# Patient Record
Sex: MALE | Race: WHITE | Employment: FULL TIME | URBAN - METROPOLITAN AREA
[De-identification: names, ages, dates, MRNs, and addresses within clinical notes are randomized per-mention and may not be internally consistent; named-entity substitution may affect disease eponyms.]

---

## 2023-06-20 ASSESSMENT — PROMIS GLOBAL HEALTH SCALE
RATE_QUALITY_OF_LIFE: EXCELLENT
CARRYOUT_PHYSICAL_ACTIVITIES: COMPLETELY
RATE_GENERAL_HEALTH: VERY GOOD
RATE_PHYSICAL_HEALTH: VERY GOOD
RATE_AVERAGE_PAIN: 0
RATE_SOCIAL_SATISFACTION: VERY GOOD
EMOTIONAL_PROBLEMS: RARELY
RATE_AVERAGE_FATIGUE: MILD
RATE_MENTAL_HEALTH: EXCELLENT
CARRYOUT_SOCIAL_ACTIVITIES: EXCELLENT

## 2023-06-22 ENCOUNTER — LAB (OUTPATIENT)
Dept: LAB | Facility: LAB | Age: 53
End: 2023-06-22
Payer: COMMERCIAL

## 2023-06-22 ENCOUNTER — OFFICE VISIT (OUTPATIENT)
Dept: PRIMARY CARE | Facility: CLINIC | Age: 53
End: 2023-06-22
Payer: COMMERCIAL

## 2023-06-22 VITALS
SYSTOLIC BLOOD PRESSURE: 127 MMHG | TEMPERATURE: 98.3 F | WEIGHT: 214 LBS | BODY MASS INDEX: 30.64 KG/M2 | OXYGEN SATURATION: 93 % | HEIGHT: 70 IN | DIASTOLIC BLOOD PRESSURE: 82 MMHG | HEART RATE: 72 BPM

## 2023-06-22 DIAGNOSIS — E78.5 DYSLIPIDEMIA: Primary | ICD-10-CM

## 2023-06-22 DIAGNOSIS — Z13.6 SCREENING FOR HEART DISEASE: ICD-10-CM

## 2023-06-22 DIAGNOSIS — J30.1 NON-SEASONAL ALLERGIC RHINITIS DUE TO POLLEN: ICD-10-CM

## 2023-06-22 DIAGNOSIS — Z00.00 ROUTINE GENERAL MEDICAL EXAMINATION AT A HEALTH CARE FACILITY: ICD-10-CM

## 2023-06-22 DIAGNOSIS — R05.3 CHRONIC COUGH: ICD-10-CM

## 2023-06-22 DIAGNOSIS — Z12.11 COLON CANCER SCREENING: ICD-10-CM

## 2023-06-22 DIAGNOSIS — E78.5 DYSLIPIDEMIA: ICD-10-CM

## 2023-06-22 LAB
ALANINE AMINOTRANSFERASE (SGPT) (U/L) IN SER/PLAS: 19 U/L (ref 10–52)
ALBUMIN (G/DL) IN SER/PLAS: 4.5 G/DL (ref 3.4–5)
ALKALINE PHOSPHATASE (U/L) IN SER/PLAS: 56 U/L (ref 33–120)
ANION GAP IN SER/PLAS: 13 MMOL/L (ref 10–20)
APPEARANCE, URINE: CLEAR
ASPARTATE AMINOTRANSFERASE (SGOT) (U/L) IN SER/PLAS: 15 U/L (ref 9–39)
BASOPHILS (10*3/UL) IN BLOOD BY AUTOMATED COUNT: 0.04 X10E9/L (ref 0–0.1)
BASOPHILS/100 LEUKOCYTES IN BLOOD BY AUTOMATED COUNT: 0.5 % (ref 0–2)
BILIRUBIN TOTAL (MG/DL) IN SER/PLAS: 0.9 MG/DL (ref 0–1.2)
BILIRUBIN, URINE: NEGATIVE
BLOOD, URINE: NEGATIVE
CALCIUM (MG/DL) IN SER/PLAS: 9.8 MG/DL (ref 8.6–10.6)
CARBON DIOXIDE, TOTAL (MMOL/L) IN SER/PLAS: 29 MMOL/L (ref 21–32)
CHLORIDE (MMOL/L) IN SER/PLAS: 104 MMOL/L (ref 98–107)
CHOLESTEROL (MG/DL) IN SER/PLAS: 184 MG/DL (ref 0–199)
CHOLESTEROL IN HDL (MG/DL) IN SER/PLAS: 44.1 MG/DL
CHOLESTEROL/HDL RATIO: 4.2
COLOR, URINE: NORMAL
CREATININE (MG/DL) IN SER/PLAS: 1.04 MG/DL (ref 0.5–1.3)
EOSINOPHILS (10*3/UL) IN BLOOD BY AUTOMATED COUNT: 0.18 X10E9/L (ref 0–0.7)
EOSINOPHILS/100 LEUKOCYTES IN BLOOD BY AUTOMATED COUNT: 2.1 % (ref 0–6)
ERYTHROCYTE DISTRIBUTION WIDTH (RATIO) BY AUTOMATED COUNT: 13 % (ref 11.5–14.5)
ERYTHROCYTE MEAN CORPUSCULAR HEMOGLOBIN CONCENTRATION (G/DL) BY AUTOMATED: 32.7 G/DL (ref 32–36)
ERYTHROCYTE MEAN CORPUSCULAR VOLUME (FL) BY AUTOMATED COUNT: 89 FL (ref 80–100)
ERYTHROCYTES (10*6/UL) IN BLOOD BY AUTOMATED COUNT: 5.04 X10E12/L (ref 4.5–5.9)
ESTIMATED AVERAGE GLUCOSE FOR HBA1C: 103 MG/DL
GFR MALE: 85 ML/MIN/1.73M2
GLUCOSE (MG/DL) IN SER/PLAS: 109 MG/DL (ref 74–99)
GLUCOSE, URINE: NEGATIVE MG/DL
HEMATOCRIT (%) IN BLOOD BY AUTOMATED COUNT: 45 % (ref 41–52)
HEMOGLOBIN (G/DL) IN BLOOD: 14.7 G/DL (ref 13.5–17.5)
HEMOGLOBIN A1C/HEMOGLOBIN TOTAL IN BLOOD: 5.2 %
IMMATURE GRANULOCYTES/100 LEUKOCYTES IN BLOOD BY AUTOMATED COUNT: 1.1 % (ref 0–0.9)
KETONES, URINE: NEGATIVE MG/DL
LDL: 108 MG/DL (ref 0–99)
LEUKOCYTE ESTERASE, URINE: NEGATIVE
LEUKOCYTES (10*3/UL) IN BLOOD BY AUTOMATED COUNT: 8.4 X10E9/L (ref 4.4–11.3)
LYMPHOCYTES (10*3/UL) IN BLOOD BY AUTOMATED COUNT: 1.72 X10E9/L (ref 1.2–4.8)
LYMPHOCYTES/100 LEUKOCYTES IN BLOOD BY AUTOMATED COUNT: 20.5 % (ref 13–44)
MONOCYTES (10*3/UL) IN BLOOD BY AUTOMATED COUNT: 0.61 X10E9/L (ref 0.1–1)
MONOCYTES/100 LEUKOCYTES IN BLOOD BY AUTOMATED COUNT: 7.3 % (ref 2–10)
NEUTROPHILS (10*3/UL) IN BLOOD BY AUTOMATED COUNT: 5.77 X10E9/L (ref 1.2–7.7)
NEUTROPHILS/100 LEUKOCYTES IN BLOOD BY AUTOMATED COUNT: 68.5 % (ref 40–80)
NITRITE, URINE: NEGATIVE
NRBC (PER 100 WBCS) BY AUTOMATED COUNT: 0 /100 WBC (ref 0–0)
PH, URINE: 6 (ref 5–8)
PLATELETS (10*3/UL) IN BLOOD AUTOMATED COUNT: 289 X10E9/L (ref 150–450)
POTASSIUM (MMOL/L) IN SER/PLAS: 4.5 MMOL/L (ref 3.5–5.3)
PROSTATE SPECIFIC ANTIGEN,SCREEN: 1.44 NG/ML (ref 0–4)
PROTEIN TOTAL: 6.8 G/DL (ref 6.4–8.2)
PROTEIN, URINE: NEGATIVE MG/DL
SODIUM (MMOL/L) IN SER/PLAS: 141 MMOL/L (ref 136–145)
SPECIFIC GRAVITY, URINE: 1.01 (ref 1–1.03)
TRIGLYCERIDE (MG/DL) IN SER/PLAS: 161 MG/DL (ref 0–149)
UREA NITROGEN (MG/DL) IN SER/PLAS: 17 MG/DL (ref 6–23)
UROBILINOGEN, URINE: <2 MG/DL (ref 0–1.9)
VLDL: 32 MG/DL (ref 0–40)

## 2023-06-22 PROCEDURE — 85025 COMPLETE CBC W/AUTO DIFF WBC: CPT

## 2023-06-22 PROCEDURE — 84153 ASSAY OF PSA TOTAL: CPT

## 2023-06-22 PROCEDURE — 81003 URINALYSIS AUTO W/O SCOPE: CPT

## 2023-06-22 PROCEDURE — 80053 COMPREHEN METABOLIC PANEL: CPT

## 2023-06-22 PROCEDURE — 80061 LIPID PANEL: CPT

## 2023-06-22 PROCEDURE — 99214 OFFICE O/P EST MOD 30 MIN: CPT | Performed by: INTERNAL MEDICINE

## 2023-06-22 PROCEDURE — 83036 HEMOGLOBIN GLYCOSYLATED A1C: CPT

## 2023-06-22 PROCEDURE — 36415 COLL VENOUS BLD VENIPUNCTURE: CPT

## 2023-06-22 RX ORDER — ATORVASTATIN CALCIUM 40 MG/1
TABLET, FILM COATED ORAL
COMMUNITY
Start: 2013-10-16 | End: 2023-07-07 | Stop reason: SDUPTHER

## 2023-06-22 RX ORDER — IPRATROPIUM BROMIDE 21 UG/1
2 SPRAY, METERED NASAL EVERY 12 HOURS
Qty: 30 ML | Refills: 12 | Status: SHIPPED | OUTPATIENT
Start: 2023-06-22 | End: 2024-01-29 | Stop reason: WASHOUT

## 2023-06-22 ASSESSMENT — LIFESTYLE VARIABLES
HOW MANY STANDARD DRINKS CONTAINING ALCOHOL DO YOU HAVE ON A TYPICAL DAY: 1 OR 2
SKIP TO QUESTIONS 9-10: 0
HOW OFTEN DO YOU HAVE SIX OR MORE DRINKS ON ONE OCCASION: LESS THAN MONTHLY
HOW OFTEN DO YOU HAVE A DRINK CONTAINING ALCOHOL: MONTHLY OR LESS
AUDIT-C TOTAL SCORE: 2

## 2023-06-22 ASSESSMENT — PATIENT HEALTH QUESTIONNAIRE - PHQ9
SUM OF ALL RESPONSES TO PHQ9 QUESTIONS 1 & 2: 0
2. FEELING DOWN, DEPRESSED OR HOPELESS: NOT AT ALL
1. LITTLE INTEREST OR PLEASURE IN DOING THINGS: NOT AT ALL

## 2023-06-22 ASSESSMENT — COLUMBIA-SUICIDE SEVERITY RATING SCALE - C-SSRS: 1. IN THE PAST MONTH, HAVE YOU WISHED YOU WERE DEAD OR WISHED YOU COULD GO TO SLEEP AND NOT WAKE UP?: NO

## 2023-06-22 NOTE — PROGRESS NOTES
"Subjective   Patient ID: Onofre Jiang Jr. is a 52 y.o. male who presents for Annual Exam.    HPI patient presents to clinic complaining of chronic dry cough, watery eyes and postnasal drip for the past 6 months.  He also had an episode of fullness in his chest which got relieved with aspirin several months ago.  He is also concerned about tingling of his left foot and nocturia over the past few months.  He denies any fever, chills, shortness of breath, wheezing, palpitation diaphoresis and loss of consciousness.  He has history of  skin cancer, seasonal allergies, hyperlipidemia hypertriglyceridemia and had a normal stress echo in 2010 with EF of 60% EKG done in the office showed sinus rhythm at 60 bpm with normal axis normal interval without any ischemic changes        Review of Systems   Constitutional: Negative.    HENT: Negative.     Eyes: Negative.    Respiratory:  Positive for cough.    Cardiovascular: Negative.    Gastrointestinal: Negative.    Endocrine: Negative.    Genitourinary: Negative.    Musculoskeletal: Negative.    Skin: Negative.    Allergic/Immunologic: Negative.    Neurological: Negative.    Hematological: Negative.    Psychiatric/Behavioral: Negative.         Objective   /82   Pulse 72   Temp 36.8 °C (98.3 °F)   Ht 1.778 m (5' 10\")   Wt 97.1 kg (214 lb)   SpO2 93%   BMI 30.71 kg/m²     Physical Exam  Constitutional:       Appearance: Normal appearance. He is obese.   HENT:      Right Ear: Tympanic membrane normal.      Left Ear: Tympanic membrane and ear canal normal.      Nose: Nose normal.   Neck:      Vascular: No carotid bruit.   Cardiovascular:      Rate and Rhythm: Normal rate.   Pulmonary:      Effort: No respiratory distress.      Breath sounds: No stridor. No wheezing.   Abdominal:      Palpations: Abdomen is soft.      Tenderness: There is no guarding or rebound.   Skin:     Coloration: Skin is not jaundiced.      Findings: No bruising.   Neurological:      General: No " focal deficit present.      Mental Status: He is alert and oriented to person, place, and time.   Psychiatric:         Mood and Affect: Mood normal.         Assessment/Plan    patient will be scheduled for CT cardiac scoring regarding screening for cardiovascular disease and  colonoscopy for colon cancer screening.  He will be scheduled for routine blood work and is given trial with Atrovent nasal spray for allergy symptoms.  He will return to clinic in 2 weeks for follow-up visit.

## 2023-06-22 NOTE — PROGRESS NOTES
"Subjective   Patient ID: Onofre Jiang Jr. is a 52 y.o. male who presents for Annual Exam.    HPI     Review of Systems    Objective   /82   Pulse 72   Temp 36.8 °C (98.3 °F)   Ht 1.778 m (5' 10\")   Wt 97.1 kg (214 lb)   SpO2 93%   BMI 30.71 kg/m²     Physical Exam    Assessment/Plan          "

## 2023-06-24 ASSESSMENT — ENCOUNTER SYMPTOMS
GASTROINTESTINAL NEGATIVE: 1
EYES NEGATIVE: 1
COUGH: 1
ALLERGIC/IMMUNOLOGIC NEGATIVE: 1
CONSTITUTIONAL NEGATIVE: 1
NEUROLOGICAL NEGATIVE: 1
CARDIOVASCULAR NEGATIVE: 1
MUSCULOSKELETAL NEGATIVE: 1
ENDOCRINE NEGATIVE: 1
HEMATOLOGIC/LYMPHATIC NEGATIVE: 1
PSYCHIATRIC NEGATIVE: 1

## 2023-07-06 ENCOUNTER — APPOINTMENT (OUTPATIENT)
Dept: PRIMARY CARE | Facility: CLINIC | Age: 53
End: 2023-07-06
Payer: COMMERCIAL

## 2023-07-07 DIAGNOSIS — E78.5 DYSLIPIDEMIA: Primary | ICD-10-CM

## 2023-07-07 RX ORDER — ATORVASTATIN CALCIUM 40 MG/1
40 TABLET, FILM COATED ORAL DAILY
Qty: 90 TABLET | Refills: 2 | Status: SHIPPED | OUTPATIENT
Start: 2023-07-07 | End: 2023-07-27 | Stop reason: SDUPTHER

## 2023-07-27 ENCOUNTER — OFFICE VISIT (OUTPATIENT)
Dept: PRIMARY CARE | Facility: CLINIC | Age: 53
End: 2023-07-27
Payer: COMMERCIAL

## 2023-07-27 VITALS
SYSTOLIC BLOOD PRESSURE: 139 MMHG | OXYGEN SATURATION: 95 % | TEMPERATURE: 98 F | HEART RATE: 76 BPM | HEIGHT: 70 IN | BODY MASS INDEX: 31.78 KG/M2 | WEIGHT: 222 LBS | DIASTOLIC BLOOD PRESSURE: 89 MMHG

## 2023-07-27 DIAGNOSIS — I77.810 DILATATION OF THORACIC AORTA (CMS-HCC): Primary | ICD-10-CM

## 2023-07-27 DIAGNOSIS — E78.5 DYSLIPIDEMIA: ICD-10-CM

## 2023-07-27 DIAGNOSIS — N40.1 BENIGN PROSTATIC HYPERPLASIA WITH WEAK URINARY STREAM: ICD-10-CM

## 2023-07-27 DIAGNOSIS — R73.9 HYPERGLYCEMIA: ICD-10-CM

## 2023-07-27 DIAGNOSIS — F10.20 UNCOMPLICATED ALCOHOL DEPENDENCE (MULTI): ICD-10-CM

## 2023-07-27 DIAGNOSIS — R39.12 BENIGN PROSTATIC HYPERPLASIA WITH WEAK URINARY STREAM: ICD-10-CM

## 2023-07-27 DIAGNOSIS — I25.10 ATHEROSCLEROSIS OF NATIVE CORONARY ARTERY OF NATIVE HEART WITHOUT ANGINA PECTORIS: ICD-10-CM

## 2023-07-27 PROBLEM — J30.1 SEASONAL ALLERGIC RHINITIS DUE TO POLLEN: Status: ACTIVE | Noted: 2023-07-27

## 2023-07-27 PROCEDURE — 99214 OFFICE O/P EST MOD 30 MIN: CPT | Performed by: INTERNAL MEDICINE

## 2023-07-27 RX ORDER — ATORVASTATIN CALCIUM 40 MG/1
40 TABLET, FILM COATED ORAL DAILY
Qty: 90 TABLET | Refills: 2 | Status: SHIPPED | OUTPATIENT
Start: 2023-07-27 | End: 2024-01-29 | Stop reason: WASHOUT

## 2023-07-27 ASSESSMENT — LIFESTYLE VARIABLES
HOW OFTEN DO YOU HAVE A DRINK CONTAINING ALCOHOL: MONTHLY OR LESS
HOW OFTEN DO YOU HAVE SIX OR MORE DRINKS ON ONE OCCASION: LESS THAN MONTHLY
HOW MANY STANDARD DRINKS CONTAINING ALCOHOL DO YOU HAVE ON A TYPICAL DAY: 1 OR 2
AUDIT-C TOTAL SCORE: 2
SKIP TO QUESTIONS 9-10: 0

## 2023-07-27 ASSESSMENT — PATIENT HEALTH QUESTIONNAIRE - PHQ9
2. FEELING DOWN, DEPRESSED OR HOPELESS: NOT AT ALL
1. LITTLE INTEREST OR PLEASURE IN DOING THINGS: NOT AT ALL
SUM OF ALL RESPONSES TO PHQ9 QUESTIONS 1 AND 2: 0

## 2023-07-27 ASSESSMENT — COLUMBIA-SUICIDE SEVERITY RATING SCALE - C-SSRS
1. IN THE PAST MONTH, HAVE YOU WISHED YOU WERE DEAD OR WISHED YOU COULD GO TO SLEEP AND NOT WAKE UP?: NO
2. HAVE YOU ACTUALLY HAD ANY THOUGHTS OF KILLING YOURSELF?: NO
6. HAVE YOU EVER DONE ANYTHING, STARTED TO DO ANYTHING, OR PREPARED TO DO ANYTHING TO END YOUR LIFE?: NO

## 2023-07-27 ASSESSMENT — PAIN SCALES - GENERAL: PAINLEVEL: 0-NO PAIN

## 2023-07-27 NOTE — PROGRESS NOTES
"Subjective   Patient ID: Onofre Jiang Jr. is a 52 y.o. male who presents for Follow-up.    HPI     Review of Systems    Objective   /89   Pulse 76   Temp 36.7 °C (98 °F)   Ht 1.778 m (5' 10\")   Wt 101 kg (222 lb)   SpO2 95%   BMI 31.85 kg/m²     Physical Exam    Assessment/Plan          "

## 2023-07-27 NOTE — PROGRESS NOTES
"Subjective   Patient ID: Onofre Jiang Jr. is a 52 y.o. male who presents for Follow-up.    HPI presents to clinic for follow-up visit on history of admits to drinking alcohol f  skin cancer, seasonal allergies, hyperlipidemia hypertriglyceridemia and had a normal stress echo in 2010 with EF of 60% he is complaining of nocturia weak urinary stream's over the past few months.  He denies any abdominal pain, nausea vomiting flank pain dysuria , cough congestion shortness of breath and wheezing.  Laboratory studies done shows hemoglobin A1c of 5.2% , serum glucose of 109 potassium of 4.5, serum cholesterol of 184 LDL of 108 and other studies are unremarkable.  CT cardiac scoring came back coronary artery calcium score of 128.27 elevated thoracic aorta measuring 4 cm without any evidence of mediastinal lymphadenopathy or lung nodule.  He also admits to drinking alcohol on a daily basis.       Review of Systems   Constitutional: Negative.    HENT: Negative.     Eyes: Negative.    Respiratory: Negative.     Cardiovascular: Negative.    Gastrointestinal: Negative.    Endocrine: Negative.    Genitourinary:  Positive for decreased urine volume.   Musculoskeletal: Negative.    Skin: Negative.    Allergic/Immunologic: Negative.    Neurological: Negative.    Hematological: Negative.    Psychiatric/Behavioral: Negative.         Objective   /89   Pulse 76   Temp 36.7 °C (98 °F)   Ht 1.778 m (5' 10\")   Wt 101 kg (222 lb)   SpO2 95%   BMI 31.85 kg/m²     Physical Exam  Constitutional:       Appearance: Normal appearance. He is normal weight.   HENT:      Right Ear: Tympanic membrane normal.      Left Ear: Tympanic membrane and ear canal normal.      Nose: Nose normal.   Neck:      Vascular: No carotid bruit.   Cardiovascular:      Rate and Rhythm: Normal rate.   Pulmonary:      Effort: No respiratory distress.      Breath sounds: No stridor. No wheezing.   Abdominal:      Palpations: Abdomen is soft.      Tenderness: " There is abdominal tenderness. There is no guarding or rebound.   Skin:     Coloration: Skin is not jaundiced.   Neurological:      General: No focal deficit present.      Mental Status: He is alert and oriented to person, place, and time.   Psychiatric:         Mood and Affect: Mood normal.         Assessment/Plan    patient will be referred to cardiology clinic regarding coronary atherosclerosis and dilated thoracic aorta.  He is encouraged to cut down on drinking alcohol.  He is interested in taking saw palmetto regarding his symptoms of BPH.  He will be referred to urology clinic regarding his symptoms of BPH.  He will continue current medications and will return to clinic in 6 months for follow-up visit.

## 2023-07-28 ASSESSMENT — ENCOUNTER SYMPTOMS
CARDIOVASCULAR NEGATIVE: 1
NEUROLOGICAL NEGATIVE: 1
HEMATOLOGIC/LYMPHATIC NEGATIVE: 1
RESPIRATORY NEGATIVE: 1
MUSCULOSKELETAL NEGATIVE: 1
CONSTITUTIONAL NEGATIVE: 1
EYES NEGATIVE: 1
GASTROINTESTINAL NEGATIVE: 1
ALLERGIC/IMMUNOLOGIC NEGATIVE: 1
ENDOCRINE NEGATIVE: 1
PSYCHIATRIC NEGATIVE: 1

## 2023-10-06 DIAGNOSIS — Z12.11 COLON CANCER SCREENING: ICD-10-CM

## 2023-10-06 RX ORDER — POLYETHYLENE GLYCOL 3350, SODIUM SULFATE ANHYDROUS, SODIUM BICARBONATE, SODIUM CHLORIDE, POTASSIUM CHLORIDE 236; 22.74; 6.74; 5.86; 2.97 G/4L; G/4L; G/4L; G/4L; G/4L
4000 POWDER, FOR SOLUTION ORAL ONCE
Qty: 4000 ML | Refills: 0 | Status: SHIPPED | OUTPATIENT
Start: 2023-10-06 | End: 2023-10-06

## 2024-01-15 DIAGNOSIS — E78.5 HYPERLIPIDEMIA, UNSPECIFIED HYPERLIPIDEMIA TYPE: ICD-10-CM

## 2024-01-18 ENCOUNTER — LAB (OUTPATIENT)
Dept: LAB | Facility: LAB | Age: 54
End: 2024-01-18
Payer: COMMERCIAL

## 2024-01-18 DIAGNOSIS — I25.10 ATHEROSCLEROSIS OF NATIVE CORONARY ARTERY OF NATIVE HEART WITHOUT ANGINA PECTORIS: ICD-10-CM

## 2024-01-18 DIAGNOSIS — R73.9 HYPERGLYCEMIA: ICD-10-CM

## 2024-01-18 DIAGNOSIS — E78.5 HYPERLIPIDEMIA, UNSPECIFIED HYPERLIPIDEMIA TYPE: ICD-10-CM

## 2024-01-18 LAB
ANION GAP SERPL CALC-SCNC: 14 MMOL/L (ref 10–20)
BUN SERPL-MCNC: 18 MG/DL (ref 6–23)
CALCIUM SERPL-MCNC: 9.9 MG/DL (ref 8.6–10.6)
CHLORIDE SERPL-SCNC: 100 MMOL/L (ref 98–107)
CHOLEST SERPL-MCNC: 195 MG/DL (ref 0–199)
CHOLESTEROL/HDL RATIO: 3.9
CO2 SERPL-SCNC: 29 MMOL/L (ref 21–32)
CREAT SERPL-MCNC: 1.1 MG/DL (ref 0.5–1.3)
EGFRCR SERPLBLD CKD-EPI 2021: 80 ML/MIN/1.73M*2
EST. AVERAGE GLUCOSE BLD GHB EST-MCNC: 108 MG/DL
GLUCOSE SERPL-MCNC: 100 MG/DL (ref 74–99)
HBA1C MFR BLD: 5.4 %
HDLC SERPL-MCNC: 49.4 MG/DL
LDLC SERPL CALC-MCNC: 86 MG/DL
NON HDL CHOLESTEROL: 146 MG/DL (ref 0–149)
POTASSIUM SERPL-SCNC: 4.4 MMOL/L (ref 3.5–5.3)
SODIUM SERPL-SCNC: 139 MMOL/L (ref 136–145)
TRIGL SERPL-MCNC: 299 MG/DL (ref 0–149)
VLDL: 60 MG/DL (ref 0–40)

## 2024-01-18 PROCEDURE — 80048 BASIC METABOLIC PNL TOTAL CA: CPT

## 2024-01-18 PROCEDURE — 36415 COLL VENOUS BLD VENIPUNCTURE: CPT

## 2024-01-18 PROCEDURE — 83036 HEMOGLOBIN GLYCOSYLATED A1C: CPT

## 2024-01-18 PROCEDURE — 80061 LIPID PANEL: CPT

## 2024-01-29 ENCOUNTER — OFFICE VISIT (OUTPATIENT)
Dept: PRIMARY CARE | Facility: CLINIC | Age: 54
End: 2024-01-29
Payer: COMMERCIAL

## 2024-01-29 VITALS
BODY MASS INDEX: 32.5 KG/M2 | HEART RATE: 75 BPM | WEIGHT: 227 LBS | OXYGEN SATURATION: 94 % | DIASTOLIC BLOOD PRESSURE: 91 MMHG | HEIGHT: 70 IN | TEMPERATURE: 98.6 F | SYSTOLIC BLOOD PRESSURE: 142 MMHG

## 2024-01-29 DIAGNOSIS — E78.49 OTHER HYPERLIPIDEMIA: ICD-10-CM

## 2024-01-29 DIAGNOSIS — Z78.9 ALCOHOL USE: ICD-10-CM

## 2024-01-29 DIAGNOSIS — J30.1 SEASONAL ALLERGIC RHINITIS DUE TO POLLEN: ICD-10-CM

## 2024-01-29 DIAGNOSIS — I25.10 ATHEROSCLEROSIS OF NATIVE CORONARY ARTERY OF NATIVE HEART WITHOUT ANGINA PECTORIS: ICD-10-CM

## 2024-01-29 DIAGNOSIS — R73.9 HYPERGLYCEMIA: Primary | ICD-10-CM

## 2024-01-29 PROCEDURE — 99214 OFFICE O/P EST MOD 30 MIN: CPT | Performed by: INTERNAL MEDICINE

## 2024-01-29 PROCEDURE — 1036F TOBACCO NON-USER: CPT | Performed by: INTERNAL MEDICINE

## 2024-01-29 RX ORDER — FLUTICASONE PROPIONATE 50 MCG
SPRAY, SUSPENSION (ML) NASAL
COMMUNITY
Start: 2021-04-22 | End: 2024-02-02 | Stop reason: ALTCHOICE

## 2024-01-29 RX ORDER — ASPIRIN 81 MG/1
1 TABLET ORAL DAILY
COMMUNITY
Start: 2023-07-28

## 2024-01-29 RX ORDER — ROSUVASTATIN CALCIUM 40 MG/1
1 TABLET, COATED ORAL NIGHTLY
COMMUNITY
Start: 2023-07-28

## 2024-01-29 ASSESSMENT — PATIENT HEALTH QUESTIONNAIRE - PHQ9
1. LITTLE INTEREST OR PLEASURE IN DOING THINGS: NOT AT ALL
SUM OF ALL RESPONSES TO PHQ9 QUESTIONS 1 AND 2: 0
2. FEELING DOWN, DEPRESSED OR HOPELESS: NOT AT ALL

## 2024-01-29 ASSESSMENT — LIFESTYLE VARIABLES
HOW OFTEN DO YOU HAVE A DRINK CONTAINING ALCOHOL: MONTHLY OR LESS
HOW OFTEN DO YOU HAVE SIX OR MORE DRINKS ON ONE OCCASION: LESS THAN MONTHLY
AUDIT-C TOTAL SCORE: 2
HOW MANY STANDARD DRINKS CONTAINING ALCOHOL DO YOU HAVE ON A TYPICAL DAY: 1 OR 2
SKIP TO QUESTIONS 9-10: 0

## 2024-01-29 ASSESSMENT — COLUMBIA-SUICIDE SEVERITY RATING SCALE - C-SSRS: 1. IN THE PAST MONTH, HAVE YOU WISHED YOU WERE DEAD OR WISHED YOU COULD GO TO SLEEP AND NOT WAKE UP?: NO

## 2024-01-29 ASSESSMENT — PAIN SCALES - GENERAL: PAINLEVEL: 0-NO PAIN

## 2024-01-29 NOTE — PROGRESS NOTES
"Subjective   Patient ID: Onofre Jiang Jr. is a 53 y.o. male who presents for Follow-up (6 month ).    HPI     Review of Systems    Objective   BP (!) 142/91   Pulse 75   Temp 37 °C (98.6 °F)   Ht 1.778 m (5' 10\")   Wt 103 kg (227 lb)   SpO2 94%   BMI 32.57 kg/m²     Physical Exam    Assessment/Plan          "

## 2024-01-29 NOTE — PROGRESS NOTES
"Subjective   Patient ID: Onofre Jiang Jr. is a 53 y.o. male who presents for Follow-up (6 month ).    HPI patient presents to clinic for follow-up visit on history of alcohol use, dilated ascending thoracic aorta, coronary atherosclerosis, seasonal allergies, skin cancer, hyperlipidemia and hypertriglyceridemia.  He is doing fairly well and denies any chest pain, fever chills cough congestion and swelling of legs.  Laboratory studies done shows serum glucose of 100 creatinine of 1.10, hemoglobin A1c of 5.4%, cholesterol of 195 triglycerides of 299 HDL of 49.4 and other studies are unremarkable.  CT cardiac scoring done revealed coronary artery calcium score of 128.27 and aneurysmal dilatation of the ascending thoracic aorta.  2D echocardiogram done showed ejection fraction of 60 to 65% without any valvular heart disease or pericardial effusion.       Review of Systems   Constitutional: Negative.    HENT: Negative.     Eyes: Negative.    Respiratory: Negative.     Cardiovascular: Negative.    Gastrointestinal: Negative.    Endocrine: Negative.    Genitourinary: Negative.    Musculoskeletal: Negative.    Skin: Negative.    Allergic/Immunologic: Negative.    Neurological: Negative.    Hematological: Negative.    Psychiatric/Behavioral: Negative.         Objective   BP (!) 142/91   Pulse 75   Temp 37 °C (98.6 °F)   Ht 1.778 m (5' 10\")   Wt 103 kg (227 lb)   SpO2 94%   BMI 32.57 kg/m²     Physical Exam  Constitutional:       Appearance: Normal appearance. He is obese.   HENT:      Right Ear: Tympanic membrane normal.      Left Ear: Tympanic membrane and ear canal normal.      Nose: Nose normal.   Neck:      Vascular: No carotid bruit.   Cardiovascular:      Rate and Rhythm: Normal rate.   Pulmonary:      Effort: No respiratory distress.      Breath sounds: No stridor. No wheezing.   Abdominal:      Palpations: Abdomen is soft.      Tenderness: There is no guarding or rebound.   Skin:     Coloration: Skin is not " jaundiced.   Neurological:      General: No focal deficit present.      Mental Status: He is alert and oriented to person, place, and time.   Psychiatric:         Mood and Affect: Mood normal.         Assessment/Plan    patient is encouraged to cut down on drinking alcohol.  He will continue current medication.  He is advised to consider cardiology consultation regarding coronary atherosclerosis.  He will continue current medications and will return to clinic in 6 months for follow-up visit.

## 2024-02-02 ENCOUNTER — OFFICE VISIT (OUTPATIENT)
Dept: CARDIOLOGY | Facility: CLINIC | Age: 54
End: 2024-02-02
Payer: COMMERCIAL

## 2024-02-02 VITALS
HEIGHT: 70 IN | HEART RATE: 70 BPM | WEIGHT: 220 LBS | BODY MASS INDEX: 31.5 KG/M2 | DIASTOLIC BLOOD PRESSURE: 90 MMHG | SYSTOLIC BLOOD PRESSURE: 134 MMHG | OXYGEN SATURATION: 94 %

## 2024-02-02 DIAGNOSIS — E78.49 OTHER HYPERLIPIDEMIA: Primary | ICD-10-CM

## 2024-02-02 PROCEDURE — 99214 OFFICE O/P EST MOD 30 MIN: CPT | Performed by: INTERNAL MEDICINE

## 2024-02-02 PROCEDURE — 1036F TOBACCO NON-USER: CPT | Performed by: INTERNAL MEDICINE

## 2024-02-02 ASSESSMENT — ENCOUNTER SYMPTOMS
CONSTITUTIONAL NEGATIVE: 1
RESPIRATORY NEGATIVE: 1
PSYCHIATRIC NEGATIVE: 1
MUSCULOSKELETAL NEGATIVE: 1
GASTROINTESTINAL NEGATIVE: 1
NEUROLOGICAL NEGATIVE: 1
ALLERGIC/IMMUNOLOGIC NEGATIVE: 1
LOSS OF SENSATION IN FEET: 0
CARDIOVASCULAR NEGATIVE: 1
DEPRESSION: 0
HEMATOLOGIC/LYMPHATIC NEGATIVE: 1
EYES NEGATIVE: 1
OCCASIONAL FEELINGS OF UNSTEADINESS: 0
ENDOCRINE NEGATIVE: 1

## 2024-02-02 ASSESSMENT — PAIN SCALES - GENERAL: PAINLEVEL: 0-NO PAIN

## 2024-02-20 ENCOUNTER — HOSPITAL ENCOUNTER (OUTPATIENT)
Dept: OPERATING ROOM | Facility: CLINIC | Age: 54
Setting detail: OUTPATIENT SURGERY
Discharge: HOME | End: 2024-02-20
Payer: COMMERCIAL

## 2024-02-20 VITALS
WEIGHT: 220.24 LBS | TEMPERATURE: 98.2 F | SYSTOLIC BLOOD PRESSURE: 116 MMHG | RESPIRATION RATE: 16 BRPM | BODY MASS INDEX: 32.62 KG/M2 | DIASTOLIC BLOOD PRESSURE: 70 MMHG | HEIGHT: 69 IN | OXYGEN SATURATION: 95 % | HEART RATE: 65 BPM

## 2024-02-20 DIAGNOSIS — Z12.11 COLON CANCER SCREENING: Primary | ICD-10-CM

## 2024-02-20 PROCEDURE — 99153 MOD SED SAME PHYS/QHP EA: CPT

## 2024-02-20 PROCEDURE — 99152 MOD SED SAME PHYS/QHP 5/>YRS: CPT

## 2024-02-20 PROCEDURE — 3600000007 HC OR TIME - EACH INCREMENTAL 1 MINUTE - PROCEDURE LEVEL TWO

## 2024-02-20 PROCEDURE — 7100000010 HC PHASE TWO TIME - EACH INCREMENTAL 1 MINUTE

## 2024-02-20 PROCEDURE — 7100000009 HC PHASE TWO TIME - INITIAL BASE CHARGE

## 2024-02-20 PROCEDURE — 3600000002 HC OR TIME - INITIAL BASE CHARGE - PROCEDURE LEVEL TWO

## 2024-02-20 PROCEDURE — 2500000004 HC RX 250 GENERAL PHARMACY W/ HCPCS (ALT 636 FOR OP/ED): Performed by: INTERNAL MEDICINE

## 2024-02-20 PROCEDURE — 45378 DIAGNOSTIC COLONOSCOPY: CPT | Performed by: INTERNAL MEDICINE

## 2024-02-20 RX ORDER — MIDAZOLAM HYDROCHLORIDE 1 MG/ML
INJECTION, SOLUTION INTRAMUSCULAR; INTRAVENOUS AS NEEDED
Status: COMPLETED | OUTPATIENT
Start: 2024-02-20 | End: 2024-02-20

## 2024-02-20 RX ORDER — FENTANYL CITRATE 50 UG/ML
INJECTION, SOLUTION INTRAMUSCULAR; INTRAVENOUS AS NEEDED
Status: COMPLETED | OUTPATIENT
Start: 2024-02-20 | End: 2024-02-20

## 2024-02-20 RX ORDER — SODIUM CHLORIDE, SODIUM LACTATE, POTASSIUM CHLORIDE, CALCIUM CHLORIDE 600; 310; 30; 20 MG/100ML; MG/100ML; MG/100ML; MG/100ML
20 INJECTION, SOLUTION INTRAVENOUS CONTINUOUS
Status: CANCELLED | OUTPATIENT
Start: 2024-02-20

## 2024-02-20 RX ADMIN — FENTANYL CITRATE 25 MCG: 50 INJECTION, SOLUTION INTRAMUSCULAR; INTRAVENOUS at 09:05

## 2024-02-20 RX ADMIN — MIDAZOLAM 1 MG: 1 INJECTION INTRAMUSCULAR; INTRAVENOUS at 09:01

## 2024-02-20 RX ADMIN — FENTANYL CITRATE 25 MCG: 50 INJECTION, SOLUTION INTRAMUSCULAR; INTRAVENOUS at 09:01

## 2024-02-20 RX ADMIN — FENTANYL CITRATE 50 MCG: 50 INJECTION, SOLUTION INTRAMUSCULAR; INTRAVENOUS at 08:59

## 2024-02-20 RX ADMIN — MIDAZOLAM 2 MG: 1 INJECTION INTRAMUSCULAR; INTRAVENOUS at 08:59

## 2024-02-20 RX ADMIN — MIDAZOLAM 1 MG: 1 INJECTION INTRAMUSCULAR; INTRAVENOUS at 09:05

## 2024-02-20 ASSESSMENT — PAIN SCALES - GENERAL
PAINLEVEL_OUTOF10: 0 - NO PAIN
PAINLEVEL_OUTOF10: 3
PAINLEVEL_OUTOF10: 0 - NO PAIN
PAINLEVEL_OUTOF10: 0 - NO PAIN

## 2024-02-20 ASSESSMENT — PAIN - FUNCTIONAL ASSESSMENT
PAIN_FUNCTIONAL_ASSESSMENT: 0-10

## 2024-02-20 NOTE — DISCHARGE INSTRUCTIONS
Patient Instructions after a Colonoscopy      The anesthetics, sedatives or narcotics which were given to you today will be acting in your body for the next 24 hours, so you might feel a little sleepy or groggy.  This feeling should slowly wear off. Carefully read and follow the instructions.     You received sedation today:  - Do not drive or operate any machinery or power tools of any kind.   - No alcoholic beverages today, not even beer or wine.  - Do not make any important decisions or sign any legal documents.  - No over the counter medications that contain alcohol or that may cause drowsiness.  - Do not make any important decisions or sign any legal documents.    While it is common to experience mild to moderate abdominal distention, gas, or belching after your procedure, if any of these symptoms occur following discharge from the GI Lab or within one week of having your procedure, call the [facility] to be advised whether a visit to your nearest Urgent Care or Emergency Department is indicated.  Take this paper with you if you go.     - If you develop an allergic reaction to the medications that were given during your procedure such as difficulty breathing, rash, hives, severe nausea, vomiting or lightheadedness.  - If you experience chest pain, shortness of breath, severe abdominal pain, fevers and chills.  -If you develop signs and symptoms of bleeding such as blood in your spit, if your stools turn black, tarry, or bloody  - If you have not urinated within 8 hours following your procedure.  - If your IV site becomes painful, red, inflamed, or looks infected.    If you received a biopsy/polypectomy/sphincterotomy the following instructions apply below:    __ Do not use Aspirin containing products, non-steroidal medications or anti-coagulants for one week following your procedure. (Examples of these types of medications are: Advil, Arthrotec, Aleve, Coumadin, Ecotrin, Heparin, Ibuprofen,  Indocin, Motrin, Naprosyn, Nuprin, Plavix, Vioxx, and Voltarin, or their generic forms.  This list is not all-inclusive.  Check with your physician or pharmacist before resuming medications.)   __ Eat a soft diet today.  Avoid foods that are poorly digested for the next 24 hours.  These foods would include: nuts, beans, lettuce, red meats, and fried foods. Start with liquids and advance your diet as tolerated, gradually work up to eating solids.   __ Do not have a Barium Study or Enema for one week.    Your physician recommends the additional following instructions:    -You have a contact number available for emergencies. The signs and symptoms of potential delayed complications were discussed with you. You may return to normal activities tomorrow.  -Resume your previous diet.  -Continue your present medications.   -We are waiting for your pathology results.  -Your physician has recommended a repeat colonoscopy (date to be determined after pending pathology results are reviewed) for surveillance based on pathology results.  -The findings and recommendations have been discussed with you.  -The findings and recommendations were discussed with your family.  - Please see Medication Reconciliation Form for new medication/medications prescribed.       If you experience any problems or have any questions following discharge from the GI Lab, please call: [phone]

## 2024-02-20 NOTE — H&P
"History Of Present Illness  Attila Jiang Jr. \"Onofre\" is a 53 y.o. male presenting for colonoscopy for screening for colon polyps and colon cancer.     Past Medical History  Past Medical History:   Diagnosis Date    Allergic     Body mass index (BMI) 32.0-32.9, adult 11/24/2020    BMI 32.0-32.9,adult    Encounter for immunization 04/17/2020    Need for DTP vaccine    Hyperlipidemia     Tinnitus, left ear 07/07/2016    Tinnitus of left ear    Unspecified disorder of left ear 07/07/2016    Ear problems, left     Surgical History  Past Surgical History:   Procedure Laterality Date    CIRCUMCISION, PRIMARY      HERNIA REPAIR  07/01/2013    Inguinal Hernia Repair    WISDOM TOOTH EXTRACTION       Social History  He reports that he quit smoking about 25 years ago. His smoking use included cigarettes. He started smoking about 35 years ago. He has a 6.25 pack-year smoking history. He has never used smokeless tobacco. He reports current alcohol use. He reports that he does not use drugs.    Family History  Family History   Problem Relation Name Age of Onset    Early natural death Paternal Grandfather Onofre     Heart disease Paternal Grandfather Onofre     Cancer Paternal Grandmother Nichole     Learning disabilities Son Ruben     Atrial fibrillation Mother Prisca Gonzales     Cancer Father Onofre Jiang         Allergies  Allergies   Allergen Reactions    Opioids-Meperidine And Related Anaphylaxis    Opioids-Methadone And Related Anaphylaxis    Hydrocodone-Acetaminophen Diarrhea    Opioids - Morphine Analogues Angioedema     Review of Systems  Pre-sedation Evaluation:  ASA Classification - ASA 2 - Patient with mild systemic disease with no functional limitations  Mallampati Score - II (hard and soft palate, upper portion of tonsils anduvula visible)    Physical Exam   CONSTITUTIONAL: no acute distress, appears stated age  PULMONARY: clear to auscultation bilaterally  CARDIOVASCULAR: regular rate and rhythm  ABDOMEN: soft, " "non-tender  NEUROLOGIC: alert and oriented to person/place/time    Last Recorded Vitals  Blood pressure 118/67, pulse 72, temperature 36.2 °C (97.2 °F), temperature source Temporal, resp. rate 17, height 1.753 m (5' 9\"), weight 99.9 kg (220 lb 3.8 oz), SpO2 95 %.     Assessment/Plan   Will proceed with colonoscopy     PTA/Current Medications:  (Not in a hospital admission)    Current Outpatient Medications   Medication Sig Dispense Refill    aspirin 81 mg EC tablet Take 1 tablet (81 mg) by mouth once daily.      rosuvastatin (Crestor) 40 mg tablet Take 1 tablet (40 mg) by mouth once daily at bedtime.       No current facility-administered medications for this encounter.     Cher Walker MD  "

## 2024-02-26 NOTE — PROGRESS NOTES
"Primary Care Physician: Kumar Soto MD  Date of Visit: 02/02/2024  8:30 AM EST  Location of visit: [facility] [place] [address]     Chief Complaint:   Chief Complaint   Patient presents with    Follow-up     High cholesterol   Had echo end August 2023     HPI / Summary:   Attila Jiang Jr. is a 53 y.o. male presents for     ROS    Medical History:   He has a past medical history of Allergic, Body mass index (BMI) 32.0-32.9, adult (11/24/2020), Encounter for immunization (04/17/2020), Hyperlipidemia, Tinnitus, left ear (07/07/2016), and Unspecified disorder of left ear (07/07/2016).  Surgical Hx:   He has a past surgical history that includes Hernia repair (07/01/2013); Circumcision, primary; and [place] tooth extraction.   Social Hx:   He reports that he quit smoking about 25 years ago. His smoking use included cigarettes. He started smoking about 35 years ago. He has a 6.25 pack-year smoking history. He has never used smokeless tobacco. He reports current alcohol use. He reports that he does not use drugs.  Family Hx:   His family history includes Atrial fibrillation in his mother; Cancer in his father and paternal grandmother; Early natural death in his paternal grandfather; Heart disease in his paternal grandfather; Learning disabilities in his son.   Allergies:  Allergies   Allergen Reactions    Opioids-Meperidine And Related Nausea/vomiting    Opioids-Methadone And Related Anaphylaxis    Hydrocodone-Acetaminophen Diarrhea    Opioids - Morphine Analogues Angioedema     Outpatient Medications:  Current Outpatient Medications   Medication Instructions    aspirin 81 mg EC tablet 1 tablet, oral, Daily    rosuvastatin (Crestor) 40 mg tablet 1 tablet, oral, Nightly       Physical Exam:  Vitals:    02/02/24 0822   BP: 134/90   BP Location: Left arm   Patient Position: Sitting   Pulse: 70   SpO2: 94%   Weight: 99.8 kg (220 lb)   Height: 1.778 m (5' 10\")     Wt Readings from Last 5 Encounters:   02/20/24 99.9 kg (220 lb 3.8 oz) "   02/02/24 99.8 kg (220 lb)   01/29/24 103 kg (227 lb)   07/28/23 100 kg (220 lb 6 oz)   07/27/23 101 kg (222 lb)     Physical Exam  JVP not elevated. Carotid impulses are 2+ without overlying bruit.   Chest exhibits fair to good air movement with completely clear breath sounds.   The cardiac rhythm is regular with no premature beats.   Normal S1 and S2. No gallop, murmur or rub, or click.   Abdomen is soft and benign without focal tenderness.   With no lower leg edema. The pedal pulses are intact.     Last Labs:  Lab on 01/18/2024   Component Date Value    Glucose 01/18/2024 100 (H)     Sodium 01/18/2024 139     Potassium 01/18/2024 4.4     Chloride 01/18/2024 100     Bicarbonate 01/18/2024 29     Anion Gap 01/18/2024 14     Urea Nitrogen 01/18/2024 18     Creatinine 01/18/2024 1.10     eGFR 01/18/2024 80     Calcium 01/18/2024 9.9     Hemoglobin A1C 01/18/2024 5.4     Estimated Average Glucose 01/18/2024 108     Cholesterol 01/18/2024 195     HDL-Cholesterol 01/18/2024 49.4     Cholesterol/HDL Ratio 01/18/2024 3.9     LDL Calculated 01/18/2024 86     VLDL 01/18/2024 60 (H)     Triglycerides 01/18/2024 299 (H)     Non HDL Cholesterol 01/18/2024 146         Assessment/Plan           Orders:  Orders Placed This Encounter   Procedures    LDL cholesterol, direct      Followup Appts:  Future Appointments   Date Time Provider Department Center   7/29/2024  8:20 AM Kumar Soto MD [id number] [facility]   9/13/2024  8:15 AM Attila Peralta MD [id number] [facility]           ____________________________________________________________  Attila Peralta MD  [facility] [place]  [facility]

## 2024-07-16 ENCOUNTER — LAB (OUTPATIENT)
Dept: LAB | Facility: LAB | Age: 54
End: 2024-07-16
Payer: COMMERCIAL

## 2024-07-16 DIAGNOSIS — Z78.9 ALCOHOL USE: ICD-10-CM

## 2024-07-16 DIAGNOSIS — I25.10 ATHEROSCLEROSIS OF NATIVE CORONARY ARTERY OF NATIVE HEART WITHOUT ANGINA PECTORIS: ICD-10-CM

## 2024-07-16 DIAGNOSIS — R73.9 HYPERGLYCEMIA: ICD-10-CM

## 2024-07-16 DIAGNOSIS — E78.49 OTHER HYPERLIPIDEMIA: ICD-10-CM

## 2024-07-16 LAB
ALBUMIN SERPL BCP-MCNC: 4.2 G/DL (ref 3.4–5)
ALP SERPL-CCNC: 45 U/L (ref 33–120)
ALT SERPL W P-5'-P-CCNC: 19 U/L (ref 10–52)
ANION GAP SERPL CALC-SCNC: 12 MMOL/L (ref 10–20)
AST SERPL W P-5'-P-CCNC: 16 U/L (ref 9–39)
BASOPHILS # BLD AUTO: 0.05 X10*3/UL (ref 0–0.1)
BASOPHILS NFR BLD AUTO: 0.6 %
BILIRUB SERPL-MCNC: 1.1 MG/DL (ref 0–1.2)
BUN SERPL-MCNC: 16 MG/DL (ref 6–23)
CALCIUM SERPL-MCNC: 9.4 MG/DL (ref 8.6–10.6)
CHLORIDE SERPL-SCNC: 102 MMOL/L (ref 98–107)
CHOLEST SERPL-MCNC: 177 MG/DL (ref 0–199)
CHOLESTEROL/HDL RATIO: 3.5
CO2 SERPL-SCNC: 29 MMOL/L (ref 21–32)
CREAT SERPL-MCNC: 1.02 MG/DL (ref 0.5–1.3)
EGFRCR SERPLBLD CKD-EPI 2021: 87 ML/MIN/1.73M*2
EOSINOPHIL # BLD AUTO: 0.19 X10*3/UL (ref 0–0.7)
EOSINOPHIL NFR BLD AUTO: 2.1 %
ERYTHROCYTE [DISTWIDTH] IN BLOOD BY AUTOMATED COUNT: 12.9 % (ref 11.5–14.5)
EST. AVERAGE GLUCOSE BLD GHB EST-MCNC: 103 MG/DL
GGT SERPL-CCNC: 23 U/L (ref 5–64)
GLUCOSE SERPL-MCNC: 101 MG/DL (ref 74–99)
HBA1C MFR BLD: 5.2 %
HCT VFR BLD AUTO: 42.7 % (ref 41–52)
HDLC SERPL-MCNC: 49.9 MG/DL
HGB BLD-MCNC: 14.5 G/DL (ref 13.5–17.5)
IMM GRANULOCYTES # BLD AUTO: 0.09 X10*3/UL (ref 0–0.7)
IMM GRANULOCYTES NFR BLD AUTO: 1 % (ref 0–0.9)
LDLC SERPL CALC-MCNC: 63 MG/DL
LYMPHOCYTES # BLD AUTO: 2.22 X10*3/UL (ref 1.2–4.8)
LYMPHOCYTES NFR BLD AUTO: 25.1 %
MCH RBC QN AUTO: 29.1 PG (ref 26–34)
MCHC RBC AUTO-ENTMCNC: 34 G/DL (ref 32–36)
MCV RBC AUTO: 86 FL (ref 80–100)
MONOCYTES # BLD AUTO: 0.75 X10*3/UL (ref 0.1–1)
MONOCYTES NFR BLD AUTO: 8.5 %
NEUTROPHILS # BLD AUTO: 5.55 X10*3/UL (ref 1.2–7.7)
NEUTROPHILS NFR BLD AUTO: 62.7 %
NON HDL CHOLESTEROL: 127 MG/DL (ref 0–149)
NRBC BLD-RTO: 0 /100 WBCS (ref 0–0)
PLATELET # BLD AUTO: 293 X10*3/UL (ref 150–450)
POTASSIUM SERPL-SCNC: 4.3 MMOL/L (ref 3.5–5.3)
PROT SERPL-MCNC: 6.7 G/DL (ref 6.4–8.2)
RBC # BLD AUTO: 4.99 X10*6/UL (ref 4.5–5.9)
SODIUM SERPL-SCNC: 139 MMOL/L (ref 136–145)
TRIGL SERPL-MCNC: 319 MG/DL (ref 0–149)
VLDL: 64 MG/DL (ref 0–40)
WBC # BLD AUTO: 8.9 X10*3/UL (ref 4.4–11.3)

## 2024-07-16 PROCEDURE — 85025 COMPLETE CBC W/AUTO DIFF WBC: CPT

## 2024-07-16 PROCEDURE — 36415 COLL VENOUS BLD VENIPUNCTURE: CPT

## 2024-07-16 PROCEDURE — 82977 ASSAY OF GGT: CPT

## 2024-07-16 PROCEDURE — 80061 LIPID PANEL: CPT

## 2024-07-16 PROCEDURE — 83036 HEMOGLOBIN GLYCOSYLATED A1C: CPT

## 2024-07-16 PROCEDURE — 80053 COMPREHEN METABOLIC PANEL: CPT

## 2024-07-24 ENCOUNTER — LAB (OUTPATIENT)
Dept: LAB | Facility: LAB | Age: 54
End: 2024-07-24
Payer: COMMERCIAL

## 2024-07-24 DIAGNOSIS — Z78.9 ALCOHOL USE: ICD-10-CM

## 2024-07-24 LAB
APPEARANCE UR: CLEAR
BILIRUB UR STRIP.AUTO-MCNC: NEGATIVE MG/DL
COLOR UR: NORMAL
GLUCOSE UR STRIP.AUTO-MCNC: NORMAL MG/DL
KETONES UR STRIP.AUTO-MCNC: NEGATIVE MG/DL
LEUKOCYTE ESTERASE UR QL STRIP.AUTO: NEGATIVE
NITRITE UR QL STRIP.AUTO: NEGATIVE
PH UR STRIP.AUTO: 5.5 [PH]
PROT UR STRIP.AUTO-MCNC: NEGATIVE MG/DL
RBC # UR STRIP.AUTO: NEGATIVE /UL
SP GR UR STRIP.AUTO: 1.01
UROBILINOGEN UR STRIP.AUTO-MCNC: NORMAL MG/DL

## 2024-07-24 PROCEDURE — 81003 URINALYSIS AUTO W/O SCOPE: CPT

## 2024-07-29 ENCOUNTER — APPOINTMENT (OUTPATIENT)
Dept: PRIMARY CARE | Facility: CLINIC | Age: 54
End: 2024-07-29
Payer: COMMERCIAL

## 2024-07-31 ASSESSMENT — PROMIS GLOBAL HEALTH SCALE
RATE_SOCIAL_SATISFACTION: EXCELLENT
CARRYOUT_SOCIAL_ACTIVITIES: EXCELLENT
RATE_QUALITY_OF_LIFE: VERY GOOD
RATE_AVERAGE_PAIN: 1
RATE_MENTAL_HEALTH: EXCELLENT
RATE_PHYSICAL_HEALTH: VERY GOOD
CARRYOUT_PHYSICAL_ACTIVITIES: COMPLETELY
EMOTIONAL_PROBLEMS: NEVER
RATE_AVERAGE_FATIGUE: MILD
RATE_GENERAL_HEALTH: VERY GOOD

## 2024-08-02 ENCOUNTER — APPOINTMENT (OUTPATIENT)
Dept: PRIMARY CARE | Facility: CLINIC | Age: 54
End: 2024-08-02
Payer: COMMERCIAL

## 2024-08-02 VITALS
BODY MASS INDEX: 32.58 KG/M2 | SYSTOLIC BLOOD PRESSURE: 122 MMHG | DIASTOLIC BLOOD PRESSURE: 82 MMHG | TEMPERATURE: 97.2 F | HEART RATE: 74 BPM | OXYGEN SATURATION: 95 % | HEIGHT: 69 IN | WEIGHT: 220 LBS

## 2024-08-02 DIAGNOSIS — F10.20 UNCOMPLICATED ALCOHOL DEPENDENCE (MULTI): ICD-10-CM

## 2024-08-02 DIAGNOSIS — R73.9 HYPERGLYCEMIA: ICD-10-CM

## 2024-08-02 DIAGNOSIS — Z00.00 ROUTINE GENERAL MEDICAL EXAMINATION AT A HEALTH CARE FACILITY: ICD-10-CM

## 2024-08-02 DIAGNOSIS — I77.810 DILATATION OF THORACIC AORTA (CMS-HCC): ICD-10-CM

## 2024-08-02 DIAGNOSIS — E78.49 OTHER HYPERLIPIDEMIA: Primary | ICD-10-CM

## 2024-08-02 DIAGNOSIS — Z12.5 PROSTATE CANCER SCREENING: ICD-10-CM

## 2024-08-02 DIAGNOSIS — J30.1 SEASONAL ALLERGIC RHINITIS DUE TO POLLEN: ICD-10-CM

## 2024-08-02 DIAGNOSIS — I25.10 ATHEROSCLEROSIS OF NATIVE CORONARY ARTERY OF NATIVE HEART WITHOUT ANGINA PECTORIS: ICD-10-CM

## 2024-08-02 PROCEDURE — 93000 ELECTROCARDIOGRAM COMPLETE: CPT | Performed by: INTERNAL MEDICINE

## 2024-08-02 ASSESSMENT — PATIENT HEALTH QUESTIONNAIRE - PHQ9
SUM OF ALL RESPONSES TO PHQ9 QUESTIONS 1 AND 2: 0
1. LITTLE INTEREST OR PLEASURE IN DOING THINGS: NOT AT ALL
2. FEELING DOWN, DEPRESSED OR HOPELESS: NOT AT ALL

## 2024-08-02 ASSESSMENT — ENCOUNTER SYMPTOMS
OCCASIONAL FEELINGS OF UNSTEADINESS: 0
DEPRESSION: 0
LOSS OF SENSATION IN FEET: 0

## 2024-08-02 ASSESSMENT — PAIN SCALES - GENERAL: PAINLEVEL: 0-NO PAIN

## 2024-08-02 ASSESSMENT — LIFESTYLE VARIABLES
HOW OFTEN DO YOU HAVE SIX OR MORE DRINKS ON ONE OCCASION: NEVER
SKIP TO QUESTIONS 9-10: 1
HOW MANY STANDARD DRINKS CONTAINING ALCOHOL DO YOU HAVE ON A TYPICAL DAY: 1 OR 2
AUDIT-C TOTAL SCORE: 4
HOW OFTEN DO YOU HAVE A DRINK CONTAINING ALCOHOL: 4 OR MORE TIMES A WEEK

## 2024-08-02 ASSESSMENT — COLUMBIA-SUICIDE SEVERITY RATING SCALE - C-SSRS
6. HAVE YOU EVER DONE ANYTHING, STARTED TO DO ANYTHING, OR PREPARED TO DO ANYTHING TO END YOUR LIFE?: NO
2. HAVE YOU ACTUALLY HAD ANY THOUGHTS OF KILLING YOURSELF?: NO
1. IN THE PAST MONTH, HAVE YOU WISHED YOU WERE DEAD OR WISHED YOU COULD GO TO SLEEP AND NOT WAKE UP?: NO

## 2024-08-02 NOTE — PROGRESS NOTES
"Patient presents to clinic for Subjective   Patient ID: Onofre Jiang Jr. is a 53 y.o. male who presents for Annual Exam (CPE) and Shoulder Pain (Shoulder pain from push ups. Pain radiates down the arm. Pain with exercise for the last 5 months. ).    HPI patient presents to clinic for routine annual physical examination.  He has been complaining of  bilateral shoulder discomfort after push-up for the past few months.  He denies any swelling of the arm, bruising, rashes and shortness of breath.  He is also here for follow-up visit on history of alcohol use, dilated ascending thoracic aorta, coronary atherosclerosis, seasonal allergies, skin cancer, diverticulosis of the deswcending colon,hyperlipidemia and hypertriglyceridemia.  Laboratory studies done shows hemoglobin of 14.5, cholesterol of 177, triglycerides of 319 and other studies have been reviewed and discussed with him.  EKG done shows sinus rhythm at 66 bpm with normal axis normal intervals without any ischemic changes.  He underwent colonoscopy on 2/20/2024 which revealed diverticulosis of the descending colon without any polyp and showed some anterior internal hemorrhoids.       Review of Systems   Constitutional: Negative.    HENT: Negative.     Eyes: Negative.    Respiratory: Negative.     Cardiovascular: Negative.    Gastrointestinal: Negative.    Endocrine: Negative.    Genitourinary: Negative.    Musculoskeletal:  Positive for arthralgias.   Skin: Negative.    Allergic/Immunologic: Negative.    Neurological: Negative.    Hematological: Negative.    Psychiatric/Behavioral: Negative.         Objective   /82 (BP Location: Left arm, Patient Position: Sitting)   Pulse 74   Temp 36.2 °C (97.2 °F) (Temporal)   Ht 1.753 m (5' 9\")   Wt 99.8 kg (220 lb)   SpO2 95%   BMI 32.49 kg/m²     Physical Exam  Constitutional:       Appearance: Normal appearance. He is obese.   HENT:      Right Ear: Tympanic membrane normal.      Left Ear: Tympanic membrane " and ear canal normal.      Nose: Nose normal.   Neck:      Vascular: No carotid bruit.   Cardiovascular:      Rate and Rhythm: Normal rate.   Pulmonary:      Effort: No respiratory distress.      Breath sounds: No stridor. No wheezing.   Abdominal:      Palpations: Abdomen is soft.      Tenderness: There is no abdominal tenderness. There is no guarding or rebound.   Skin:     Coloration: Skin is not jaundiced.      Findings: No bruising.   Neurological:      General: No focal deficit present.      Mental Status: He is alert and oriented to person, place, and time.   Psychiatric:         Mood and Affect: Mood normal.         Assessment/Plan    patient will be scheduled for CT of the chest regarding history of dilated  thoracic aorta.  He is advised to take Tylenol and Aleve regarding shoulder discomfort.  He will continue rosuvastatin and aspirin regarding coronary atherosclerosis.  He is encouraged to cut down on drinking alcohol.  He will return to clinic annually for follow-up visit.

## 2024-08-03 ASSESSMENT — ENCOUNTER SYMPTOMS
ARTHRALGIAS: 1
CARDIOVASCULAR NEGATIVE: 1
EYES NEGATIVE: 1
PSYCHIATRIC NEGATIVE: 1
CONSTITUTIONAL NEGATIVE: 1
NEUROLOGICAL NEGATIVE: 1
ENDOCRINE NEGATIVE: 1
GASTROINTESTINAL NEGATIVE: 1
HEMATOLOGIC/LYMPHATIC NEGATIVE: 1
ALLERGIC/IMMUNOLOGIC NEGATIVE: 1
RESPIRATORY NEGATIVE: 1

## 2024-08-13 DIAGNOSIS — E78.49 OTHER HYPERLIPIDEMIA: Primary | ICD-10-CM

## 2024-08-13 RX ORDER — ROSUVASTATIN CALCIUM 40 MG/1
40 TABLET, COATED ORAL NIGHTLY
Qty: 30 TABLET | Refills: 1 | Status: SHIPPED | OUTPATIENT
Start: 2024-08-13 | End: 2024-10-12

## 2024-08-20 ENCOUNTER — LAB (OUTPATIENT)
Dept: LAB | Facility: LAB | Age: 54
End: 2024-08-20
Payer: COMMERCIAL

## 2024-08-20 DIAGNOSIS — E78.49 OTHER HYPERLIPIDEMIA: ICD-10-CM

## 2024-08-20 LAB — LDLC SERPL DIRECT ASSAY-MCNC: 102 MG/DL (ref 0–129)

## 2024-08-20 PROCEDURE — 36415 COLL VENOUS BLD VENIPUNCTURE: CPT

## 2024-08-20 PROCEDURE — 83721 ASSAY OF BLOOD LIPOPROTEIN: CPT

## 2024-09-13 ENCOUNTER — APPOINTMENT (OUTPATIENT)
Dept: CARDIOLOGY | Facility: CLINIC | Age: 54
End: 2024-09-13
Payer: COMMERCIAL

## 2024-09-30 DIAGNOSIS — M54.30 SCIATICA, UNSPECIFIED LATERALITY: Primary | ICD-10-CM

## 2024-09-30 RX ORDER — FAMOTIDINE 20 MG/1
20 TABLET, FILM COATED ORAL 2 TIMES DAILY
Qty: 14 TABLET | Refills: 0 | Status: SHIPPED | OUTPATIENT
Start: 2024-09-30 | End: 2024-10-08 | Stop reason: SDUPTHER

## 2024-09-30 RX ORDER — METHYLPREDNISOLONE 4 MG/1
TABLET ORAL
Qty: 21 TABLET | Refills: 0 | Status: SHIPPED | OUTPATIENT
Start: 2024-09-30 | End: 2024-10-07

## 2024-10-08 DIAGNOSIS — M54.30 SCIATICA, UNSPECIFIED LATERALITY: ICD-10-CM

## 2024-10-09 RX ORDER — FAMOTIDINE 20 MG/1
20 TABLET, FILM COATED ORAL 2 TIMES DAILY
Qty: 14 TABLET | Refills: 0 | Status: SHIPPED | OUTPATIENT
Start: 2024-10-09 | End: 2024-10-16

## 2024-10-16 DIAGNOSIS — M54.30 SCIATICA, UNSPECIFIED LATERALITY: Primary | ICD-10-CM

## 2024-10-16 RX ORDER — FAMOTIDINE 20 MG/1
20 TABLET, FILM COATED ORAL 2 TIMES DAILY
Qty: 14 TABLET | Refills: 0 | Status: SHIPPED | OUTPATIENT
Start: 2024-10-16 | End: 2024-10-23

## 2024-10-16 RX ORDER — METHYLPREDNISOLONE 4 MG/1
TABLET ORAL
Qty: 21 TABLET | Refills: 0 | Status: SHIPPED | OUTPATIENT
Start: 2024-10-16 | End: 2024-10-23

## 2024-10-18 DIAGNOSIS — E78.49 OTHER HYPERLIPIDEMIA: ICD-10-CM

## 2024-10-19 RX ORDER — ROSUVASTATIN CALCIUM 40 MG/1
40 TABLET, COATED ORAL NIGHTLY
Qty: 30 TABLET | Refills: 1 | Status: SHIPPED | OUTPATIENT
Start: 2024-10-19 | End: 2024-12-18

## 2024-11-04 ENCOUNTER — APPOINTMENT (OUTPATIENT)
Dept: CARDIOLOGY | Facility: CLINIC | Age: 54
End: 2024-11-04
Payer: COMMERCIAL

## 2024-11-13 ENCOUNTER — OFFICE VISIT (OUTPATIENT)
Dept: PRIMARY CARE | Facility: CLINIC | Age: 54
End: 2024-11-13
Payer: COMMERCIAL

## 2024-11-13 VITALS
HEART RATE: 82 BPM | SYSTOLIC BLOOD PRESSURE: 108 MMHG | OXYGEN SATURATION: 96 % | HEIGHT: 69 IN | BODY MASS INDEX: 31.55 KG/M2 | WEIGHT: 213 LBS | DIASTOLIC BLOOD PRESSURE: 80 MMHG

## 2024-11-13 DIAGNOSIS — G89.29 CHRONIC RIGHT-SIDED LOW BACK PAIN WITH RIGHT-SIDED SCIATICA: ICD-10-CM

## 2024-11-13 DIAGNOSIS — M54.41 CHRONIC RIGHT-SIDED LOW BACK PAIN WITH RIGHT-SIDED SCIATICA: ICD-10-CM

## 2024-11-13 DIAGNOSIS — M25.551 PAIN OF RIGHT HIP: Primary | ICD-10-CM

## 2024-11-13 PROCEDURE — 99213 OFFICE O/P EST LOW 20 MIN: CPT | Performed by: INTERNAL MEDICINE

## 2024-11-13 PROCEDURE — 3008F BODY MASS INDEX DOCD: CPT | Performed by: INTERNAL MEDICINE

## 2024-11-13 RX ORDER — MELOXICAM 7.5 MG/1
7.5 TABLET ORAL DAILY
Qty: 10 TABLET | Refills: 0 | Status: SHIPPED | OUTPATIENT
Start: 2024-11-13 | End: 2024-11-23

## 2024-11-13 RX ORDER — GABAPENTIN 100 MG/1
100 CAPSULE ORAL 3 TIMES DAILY
Qty: 30 CAPSULE | Refills: 0 | Status: SHIPPED | OUTPATIENT
Start: 2024-11-13 | End: 2024-11-23

## 2024-11-13 ASSESSMENT — LIFESTYLE VARIABLES
HOW OFTEN DO YOU HAVE SIX OR MORE DRINKS ON ONE OCCASION: NEVER
HOW OFTEN DO YOU HAVE A DRINK CONTAINING ALCOHOL: 4 OR MORE TIMES A WEEK
AUDIT-C TOTAL SCORE: 4
SKIP TO QUESTIONS 9-10: 1
HOW MANY STANDARD DRINKS CONTAINING ALCOHOL DO YOU HAVE ON A TYPICAL DAY: 1 OR 2

## 2024-11-13 ASSESSMENT — ENCOUNTER SYMPTOMS
DEPRESSION: 0
LOSS OF SENSATION IN FEET: 0
OCCASIONAL FEELINGS OF UNSTEADINESS: 0

## 2024-11-13 ASSESSMENT — PAIN SCALES - GENERAL: PAINLEVEL_OUTOF10: 6

## 2024-11-13 NOTE — PROGRESS NOTES
"Subjective   Patient ID: Onofre Jiang Jr. is a 54 y.o. male who presents for Sciatica (Been going on for about 8 weeks. Steroids are relieving the pain but is temporary.).    HPI patient presents to clinic as problem visit because of intermittent right limb and low back discomfort going on for the past 2 months.  He developed this pain after driving back from [place] [employer].  His symptoms are associated with radiation of pain to right limb and paresthesia.  He was given 2 rounds of Medrol Dosepak without any significant improvement in his back discomfort.  He denies any weakness of legs, bladder or bowel incontinence, dysuria and swelling of the limb..         Review of Systems   Constitutional: Negative.    HENT: Negative.     Eyes: Negative.    Respiratory: Negative.     Cardiovascular: Negative.    Gastrointestinal: Negative.    Endocrine: Negative.    Genitourinary: Negative.    Musculoskeletal:  Positive for arthralgias and back pain.   Skin: Negative.    Allergic/Immunologic: Negative.    Neurological: Negative.    Hematological: Negative.    Psychiatric/Behavioral: Negative.         Objective   /80 (BP Location: Right arm, Patient Position: Sitting)   Pulse 82   Ht 1.753 m (5' 9\")   Wt 96.6 kg (213 lb)   SpO2 96%   BMI 31.45 kg/m²     Physical Exam  Constitutional:       Appearance: Normal appearance. He is obese.   HENT:      Right Ear: Tympanic membrane normal.      Left Ear: Tympanic membrane and ear canal normal.      Nose: Nose normal.   Neck:      Vascular: No carotid bruit.   Cardiovascular:      Rate and Rhythm: Normal rate.   Pulmonary:      Effort: No respiratory distress.      Breath sounds: No stridor. No wheezing.   Abdominal:      Palpations: Abdomen is soft.      Tenderness: There is no abdominal tenderness. There is no guarding or rebound.   Skin:     Coloration: Skin is not jaundiced.      Findings: No bruising.   Neurological:      General: No focal deficit present.      Mental " Status: He is alert and oriented to person, place, and time.   Psychiatric:         Mood and Affect: Mood normal.       Assessment/Plan   Assessment & Plan  Pain of right hip  Patient is advised to obtain x-ray of the right hip regarding chronic right hip and limb discomfort.  He is  given trial with gabapentin and meloxicam.  Orders:  •  XR hip right with pelvis when performed 2 or 3 views; Future  •  gabapentin (Neurontin) 100 mg capsule; Take 1 capsule (100 mg) by mouth 3 times a day for 10 days.  •  meloxicam (Mobic) 7.5 mg tablet; Take 1 tablet (7.5 mg) by mouth once daily for 10 days.    Chronic right-sided low back pain with right-sided sciatica  He will be scheduled for x-ray of the lumbar spine regarding chronic low back pain and will continue meloxicam for pain.  He will be notified about x-ray results and will make further recommendations.  Orders:  •  XR lumbar spine complete 4+ views; Future  •  gabapentin (Neurontin) 100 mg capsule; Take 1 capsule (100 mg) by mouth 3 times a day for 10 days.  •  meloxicam (Mobic) 7.5 mg tablet; Take 1 tablet (7.5 mg) by mouth once daily for 10 days.

## 2024-11-14 PROBLEM — D22.60 MELANOCYTIC NEVI OF UNSPECIFIED UPPER LIMB, INCLUDING SHOULDER: Status: ACTIVE | Noted: 2021-01-25

## 2024-11-14 PROBLEM — L82.1 OTHER SEBORRHEIC KERATOSIS: Status: ACTIVE | Noted: 2021-01-26

## 2024-11-14 PROBLEM — L57.0 ACTINIC KERATOSIS: Status: ACTIVE | Noted: 2021-07-28

## 2024-11-14 PROBLEM — D18.01 HEMANGIOMA OF SKIN AND SUBCUTANEOUS TISSUE: Status: ACTIVE | Noted: 2021-01-26

## 2024-11-14 PROBLEM — Z85.828 PERSONAL HISTORY OF OTHER MALIGNANT NEOPLASM OF SKIN: Status: ACTIVE | Noted: 2022-11-04

## 2024-11-14 PROBLEM — N40.1 BENIGN PROSTATIC HYPERPLASIA WITH LOWER URINARY TRACT SYMPTOMS: Status: ACTIVE | Noted: 2024-11-14

## 2024-11-14 PROBLEM — R09.82 POSTNASAL DRIP: Status: ACTIVE | Noted: 2024-11-14

## 2024-11-14 PROBLEM — H69.93 DYSFUNCTION OF BOTH EUSTACHIAN TUBES: Status: ACTIVE | Noted: 2021-04-22

## 2024-11-14 PROBLEM — D22.9 SUSPICIOUS NEVUS: Status: ACTIVE | Noted: 2024-11-14

## 2024-11-14 PROBLEM — I25.10 CAD (CORONARY ARTERY DISEASE): Status: ACTIVE | Noted: 2024-11-14

## 2024-11-14 PROBLEM — R20.0 NUMBNESS AND TINGLING: Status: ACTIVE | Noted: 2023-03-01

## 2024-11-14 PROBLEM — H92.09 OTALGIA: Status: ACTIVE | Noted: 2024-11-14

## 2024-11-14 PROBLEM — D22.70 MELANOCYTIC NEVI OF UNSPECIFIED LOWER LIMB, INCLUDING HIP: Status: ACTIVE | Noted: 2022-11-04

## 2024-11-14 PROBLEM — D22.5 MELANOCYTIC NEVI OF TRUNK: Status: ACTIVE | Noted: 2021-01-26

## 2024-11-14 PROBLEM — R20.2 NUMBNESS AND TINGLING: Status: ACTIVE | Noted: 2023-03-01

## 2024-11-14 PROBLEM — I77.810 DILATED AORTIC ROOT (CMS-HCC): Status: ACTIVE | Noted: 2023-07-19

## 2024-11-14 PROBLEM — H60.549 ECZEMA OF EXTERNAL AUDITORY CANAL: Status: ACTIVE | Noted: 2021-04-22

## 2024-11-14 PROBLEM — L90.5 SCAR CONDITION AND FIBROSIS OF SKIN: Status: ACTIVE | Noted: 2021-07-28

## 2024-11-14 PROBLEM — C44.619 BASAL CELL CARCINOMA OF SKIN OF LEFT UPPER LIMB, INCLUDING SHOULDER: Status: ACTIVE | Noted: 2022-11-04

## 2024-11-14 PROBLEM — R35.0 URINATION FREQUENCY: Status: ACTIVE | Noted: 2023-03-01

## 2024-11-14 PROBLEM — Z80.8 FAMILY HISTORY OF MALIGNANT NEOPLASM OF OTHER ORGANS OR SYSTEMS: Status: ACTIVE | Noted: 2021-01-26

## 2024-11-14 PROBLEM — H61.23 BILATERAL IMPACTED CERUMEN: Status: ACTIVE | Noted: 2021-04-22

## 2024-11-14 PROBLEM — K61.1 PERIRECTAL ABSCESS: Status: ACTIVE | Noted: 2021-01-26

## 2024-11-14 PROBLEM — R05.3 CHRONIC COUGH: Status: ACTIVE | Noted: 2024-11-14

## 2024-11-14 PROBLEM — C44.519 BASAL CELL CARCINOMA OF SKIN OF OTHER PART OF TRUNK: Status: ACTIVE | Noted: 2022-11-04

## 2024-11-14 PROBLEM — L81.4 OTHER MELANIN HYPERPIGMENTATION: Status: ACTIVE | Noted: 2021-01-26

## 2024-11-14 PROBLEM — L91.8 OTHER HYPERTROPHIC DISORDERS OF THE SKIN: Status: ACTIVE | Noted: 2021-01-26

## 2024-11-14 PROBLEM — L73.8 OTHER SPECIFIED FOLLICULAR DISORDERS: Status: ACTIVE | Noted: 2021-07-28

## 2024-11-15 ENCOUNTER — OFFICE VISIT (OUTPATIENT)
Dept: CARDIOLOGY | Facility: CLINIC | Age: 54
End: 2024-11-15
Payer: COMMERCIAL

## 2024-11-15 ENCOUNTER — HOSPITAL ENCOUNTER (OUTPATIENT)
Dept: RADIOLOGY | Facility: CLINIC | Age: 54
Discharge: HOME | End: 2024-11-15
Payer: COMMERCIAL

## 2024-11-15 VITALS
OXYGEN SATURATION: 96 % | HEART RATE: 80 BPM | HEIGHT: 69 IN | SYSTOLIC BLOOD PRESSURE: 118 MMHG | WEIGHT: 213 LBS | DIASTOLIC BLOOD PRESSURE: 78 MMHG | BODY MASS INDEX: 31.55 KG/M2

## 2024-11-15 DIAGNOSIS — M25.551 PAIN OF RIGHT HIP: ICD-10-CM

## 2024-11-15 DIAGNOSIS — M54.41 CHRONIC RIGHT-SIDED LOW BACK PAIN WITH RIGHT-SIDED SCIATICA: ICD-10-CM

## 2024-11-15 DIAGNOSIS — G89.29 CHRONIC RIGHT-SIDED LOW BACK PAIN WITH RIGHT-SIDED SCIATICA: ICD-10-CM

## 2024-11-15 DIAGNOSIS — E78.49 OTHER HYPERLIPIDEMIA: ICD-10-CM

## 2024-11-15 DIAGNOSIS — E78.5 HYPERLIPIDEMIA, UNSPECIFIED HYPERLIPIDEMIA TYPE: Primary | ICD-10-CM

## 2024-11-15 PROCEDURE — 3008F BODY MASS INDEX DOCD: CPT | Performed by: INTERNAL MEDICINE

## 2024-11-15 PROCEDURE — 73502 X-RAY EXAM HIP UNI 2-3 VIEWS: CPT | Mod: RT

## 2024-11-15 PROCEDURE — 99214 OFFICE O/P EST MOD 30 MIN: CPT | Performed by: INTERNAL MEDICINE

## 2024-11-15 PROCEDURE — 72110 X-RAY EXAM L-2 SPINE 4/>VWS: CPT

## 2024-11-15 PROCEDURE — 1036F TOBACCO NON-USER: CPT | Performed by: INTERNAL MEDICINE

## 2024-11-15 RX ORDER — ROSUVASTATIN CALCIUM 40 MG/1
40 TABLET, COATED ORAL NIGHTLY
Qty: 90 TABLET | Refills: 3 | Status: SHIPPED | OUTPATIENT
Start: 2024-11-15 | End: 2025-11-15

## 2024-11-15 ASSESSMENT — ENCOUNTER SYMPTOMS
DEPRESSION: 0
OCCASIONAL FEELINGS OF UNSTEADINESS: 0
LOSS OF SENSATION IN FEET: 0

## 2024-11-15 ASSESSMENT — PAIN SCALES - GENERAL: PAINLEVEL_OUTOF10: 0-NO PAIN

## 2024-11-15 NOTE — PROGRESS NOTES
Primary Care Physician: Kumar Soto MD  Date of Visit: 11/15/2024  1:30 PM EST  Location of visit: [facility] [place] [address]     Chief Complaint:   Chief Complaint   Patient presents with    Follow-up     Pt denies c/o chest pain or SOB states he feels good.     Coronary Artery Disease    dilated aortic root      HPI / Summary:   Attila Jiang Jr. is a 54 y.o. male presents for follow-up    ROS    Medical History:   He has a past medical history of Allergic, Body mass index (BMI) 32.0-32.9, adult (11/24/2020), Encounter for immunization (04/17/2020), Hyperlipidemia, Tinnitus, left ear (07/07/2016), and Unspecified disorder of left ear (07/07/2016).  Surgical Hx:   He has a past surgical history that includes Hernia repair (07/01/2013); Circumcision, primary; and [place] tooth extraction.   Social Hx:   He reports that he quit smoking about 26 years ago. His smoking use included cigarettes. He started smoking about 36 years ago. He has a 6.3 pack-year smoking history. He has never used smokeless tobacco. He reports current alcohol use of about 6.0 standard drinks of alcohol per week. He reports that he does not use drugs.  Family Hx:   His family history includes Atrial fibrillation in his mother; Cancer in his father and paternal grandmother; Early natural death in his paternal grandfather; Heart disease in his paternal grandfather; Learning disabilities in his son.   Allergies:  Allergies   Allergen Reactions    Opioids-Meperidine And Related Nausea/vomiting    Opioids-Methadone And Related Nausea/vomiting    Hydrocodone-Acetaminophen Diarrhea    Opioids - Morphine Analogues Angioedema     Outpatient Medications:  Current Outpatient Medications   Medication Instructions    aspirin 81 mg EC tablet 1 tablet, Daily    gabapentin (NEURONTIN) 100 mg, oral, 3 times daily    meloxicam (MOBIC) 7.5 mg, oral, Daily    rosuvastatin (CRESTOR) 40 mg, oral, Nightly       Physical Exam:  Vitals:    11/15/24 1325 11/15/24 1358   BP:  "114/78 118/78   BP Location: Left arm    Patient Position: Sitting    Pulse: 82 80   SpO2: 96%    Weight: 96.6 kg (213 lb)    Height: 1.753 m (5' 9\")      Wt Readings from Last 5 Encounters:   11/15/24 96.6 kg (213 lb)   11/13/24 96.6 kg (213 lb)   08/02/24 99.8 kg (220 lb)   02/20/24 99.9 kg (220 lb 3.8 oz)   02/02/24 99.8 kg (220 lb)     Physical Exam  JVP not elevated. Carotid impulses are 2+ without overlying bruit.   Chest exhibits fair to good air movement with completely clear breath sounds.   The cardiac rhythm is regular with no premature beats.   Normal S1 and S2. No gallop, murmur or rub, or click.   Abdomen is soft and benign without focal tenderness.   With no lower leg edema. The pedal pulses are intact.     Last Labs:  Lab on 08/20/2024   Component Date Value    LDL, Direct 08/20/2024 102    Lab on 07/24/2024   Component Date Value    Color, Urine 07/24/2024 Light-Yellow     Appearance, Urine 07/24/2024 Clear     Specific Gravity, Urine 07/24/2024 1.015     pH, Urine 07/24/2024 5.5     Protein, Urine 07/24/2024 NEGATIVE     Glucose, Urine 07/24/2024 Normal     Blood, Urine 07/24/2024 NEGATIVE     Ketones, Urine 07/24/2024 NEGATIVE     Bilirubin, Urine 07/24/2024 NEGATIVE     Urobilinogen, Urine 07/24/2024 Normal     Nitrite, Urine 07/24/2024 NEGATIVE     Leukocyte Esterase, Urine 07/24/2024 NEGATIVE    Lab on 07/16/2024   Component Date Value    WBC 07/16/2024 8.9     nRBC 07/16/2024 0.0     RBC 07/16/2024 4.99     Hemoglobin 07/16/2024 14.5     Hematocrit 07/16/2024 42.7     MCV 07/16/2024 86     MCH 07/16/2024 29.1     MCHC 07/16/2024 34.0     RDW 07/16/2024 12.9     Platelets 07/16/2024 293     Neutrophils % 07/16/2024 62.7     Immature Granulocytes %,* 07/16/2024 1.0 (H)     Lymphocytes % 07/16/2024 25.1     Monocytes % 07/16/2024 8.5     Eosinophils % 07/16/2024 2.1     Basophils % 07/16/2024 0.6     Neutrophils Absolute 07/16/2024 5.55     Immature Granulocytes Ab* 07/16/2024 0.09     " Lymphocytes Absolute 07/16/2024 2.22     Monocytes Absolute 07/16/2024 0.75     Eosinophils Absolute 07/16/2024 0.19     Basophils Absolute 07/16/2024 0.05     Cholesterol 07/16/2024 177     HDL-Cholesterol 07/16/2024 49.9     Cholesterol/HDL Ratio 07/16/2024 3.5     LDL Calculated 07/16/2024 63     VLDL 07/16/2024 64 (H)     Triglycerides 07/16/2024 319 (H)     Non HDL Cholesterol 07/16/2024 127     Glucose 07/16/2024 101 (H)     Sodium 07/16/2024 139     Potassium 07/16/2024 4.3     Chloride 07/16/2024 102     Bicarbonate 07/16/2024 29     Anion Gap 07/16/2024 12     Urea Nitrogen 07/16/2024 16     Creatinine 07/16/2024 1.02     eGFR 07/16/2024 87     Calcium 07/16/2024 9.4     Albumin 07/16/2024 4.2     Alkaline Phosphatase 07/16/2024 45     Total Protein 07/16/2024 6.7     AST 07/16/2024 16     Bilirubin, Total 07/16/2024 1.1     ALT 07/16/2024 19     Hemoglobin A1C 07/16/2024 5.2     Estimated Average Glucose 07/16/2024 103     GGT 07/16/2024 23         Assessment/Plan   1.  Hyperlipidemia.  This patient does have a significant hyperlipidemia originally treated with atorvastatin 40 mg daily and then converted to rosuvastatin 40 mg daily.  Lipid panel 7/16/2024 includes cholesterol 177 LDL 63 triglyceride 319 HDL 49.  A repeat lab work from 8/20/2024 included a direct LDL of 102 which may be more accurate given his hypertriglyceridemia.  Discussed the option of adding ezetimibe 10 mg q. which the patient wishes to defer pending a follow-up lipid panel and direct DL to be performed just before next visit in 6 months.  If the direct LDL is not acceptable then he would be more willing to begin the ezetimibe 10 mg daily.  Will defer PCSK9 inhibitor option for now.  2.  Coronary artery disease.  This patient did have a CT coronary calcium score of 128 performed on 7/18/2023.  His EKG from 5/22/2022 showed sinus rhythm and was unremarkable.  He does have an ascending thoracic aorta measuring 4.0 cm on the CT scan.  3.   Former smoking.  Patient discontinued smoking age 26.  4.  Nondiabetic.  5.  Not hypertensive.  6.  Status post right inguinal hernia repair.  7.  Status post basal cell carcinoma removal.  8.  History of scalp cysts.        Orders:  Orders Placed This Encounter   Procedures    Lipid panel    LDL cholesterol, direct      Followup Appts:  Future Appointments   Date Time Provider Department Center   5/19/2025  4:00 PM Attila Peralta MD [id number]           ____________________________________________________________  Attila Peralta MD  [facility] [place]  [facility]

## 2024-11-17 ASSESSMENT — ENCOUNTER SYMPTOMS
RESPIRATORY NEGATIVE: 1
BACK PAIN: 1
GASTROINTESTINAL NEGATIVE: 1
CARDIOVASCULAR NEGATIVE: 1
ARTHRALGIAS: 1
EYES NEGATIVE: 1
ENDOCRINE NEGATIVE: 1
CONSTITUTIONAL NEGATIVE: 1
ALLERGIC/IMMUNOLOGIC NEGATIVE: 1
HEMATOLOGIC/LYMPHATIC NEGATIVE: 1
NEUROLOGICAL NEGATIVE: 1
PSYCHIATRIC NEGATIVE: 1

## 2024-11-18 ENCOUNTER — APPOINTMENT (OUTPATIENT)
Dept: CARDIOLOGY | Facility: CLINIC | Age: 54
End: 2024-11-18
Payer: COMMERCIAL

## 2024-11-18 ENCOUNTER — TELEPHONE (OUTPATIENT)
Dept: PRIMARY CARE | Facility: CLINIC | Age: 54
End: 2024-11-18
Payer: COMMERCIAL

## 2024-11-18 DIAGNOSIS — M54.50 CHRONIC LOW BACK PAIN WITHOUT SCIATICA, UNSPECIFIED BACK PAIN LATERALITY: Primary | ICD-10-CM

## 2024-11-18 DIAGNOSIS — G89.29 CHRONIC LOW BACK PAIN WITHOUT SCIATICA, UNSPECIFIED BACK PAIN LATERALITY: Primary | ICD-10-CM

## 2024-11-25 DIAGNOSIS — M54.41 CHRONIC RIGHT-SIDED LOW BACK PAIN WITH RIGHT-SIDED SCIATICA: ICD-10-CM

## 2024-11-25 DIAGNOSIS — M25.551 PAIN OF RIGHT HIP: ICD-10-CM

## 2024-11-25 DIAGNOSIS — G89.29 CHRONIC RIGHT-SIDED LOW BACK PAIN WITH RIGHT-SIDED SCIATICA: ICD-10-CM

## 2024-11-25 RX ORDER — GABAPENTIN 100 MG/1
100 CAPSULE ORAL 3 TIMES DAILY
Qty: 30 CAPSULE | Refills: 0 | Status: SHIPPED | OUTPATIENT
Start: 2024-11-25 | End: 2024-12-05

## 2024-12-02 DIAGNOSIS — G89.29 CHRONIC RIGHT-SIDED LOW BACK PAIN WITH RIGHT-SIDED SCIATICA: ICD-10-CM

## 2024-12-02 DIAGNOSIS — E78.49 OTHER HYPERLIPIDEMIA: ICD-10-CM

## 2024-12-02 DIAGNOSIS — M54.41 CHRONIC RIGHT-SIDED LOW BACK PAIN WITH RIGHT-SIDED SCIATICA: ICD-10-CM

## 2024-12-02 DIAGNOSIS — M25.551 PAIN OF RIGHT HIP: ICD-10-CM

## 2024-12-02 RX ORDER — MELOXICAM 7.5 MG/1
7.5 TABLET ORAL DAILY
COMMUNITY
End: 2024-12-02 | Stop reason: SDUPTHER

## 2024-12-03 RX ORDER — MELOXICAM 7.5 MG/1
7.5 TABLET ORAL DAILY
Qty: 30 TABLET | Refills: 0 | Status: SHIPPED | OUTPATIENT
Start: 2024-12-03 | End: 2025-01-02

## 2024-12-03 RX ORDER — GABAPENTIN 100 MG/1
100 CAPSULE ORAL 3 TIMES DAILY
Qty: 30 CAPSULE | Refills: 0 | Status: SHIPPED | OUTPATIENT
Start: 2024-12-03 | End: 2024-12-13

## 2025-01-16 DIAGNOSIS — M54.50 CHRONIC BILATERAL LOW BACK PAIN WITHOUT SCIATICA: ICD-10-CM

## 2025-01-16 DIAGNOSIS — M25.551 PAIN OF RIGHT HIP: Primary | ICD-10-CM

## 2025-01-16 DIAGNOSIS — G89.29 CHRONIC BILATERAL LOW BACK PAIN WITHOUT SCIATICA: ICD-10-CM

## 2025-05-19 ENCOUNTER — APPOINTMENT (OUTPATIENT)
Dept: CARDIOLOGY | Facility: CLINIC | Age: 55
End: 2025-05-19
Payer: COMMERCIAL

## 2025-06-06 LAB
CHOLEST SERPL-MCNC: 213 MG/DL
CHOLEST/HDLC SERPL: 3.9 (CALC)
HDLC SERPL-MCNC: 55 MG/DL
LDLC SERPL CALC-MCNC: 117 MG/DL (CALC)
LDLC SERPL DIRECT ASSAY-MCNC: 119 MG/DL
NONHDLC SERPL-MCNC: 158 MG/DL (CALC)
TRIGL SERPL-MCNC: 287 MG/DL

## 2025-06-16 ENCOUNTER — APPOINTMENT (OUTPATIENT)
Dept: CARDIOLOGY | Facility: CLINIC | Age: 55
End: 2025-06-16
Payer: COMMERCIAL

## 2025-07-17 ENCOUNTER — OFFICE VISIT (OUTPATIENT)
Dept: CARDIOLOGY | Facility: CLINIC | Age: 55
End: 2025-07-17
Payer: COMMERCIAL

## 2025-07-17 VITALS
HEART RATE: 79 BPM | SYSTOLIC BLOOD PRESSURE: 124 MMHG | BODY MASS INDEX: 32.49 KG/M2 | OXYGEN SATURATION: 97 % | DIASTOLIC BLOOD PRESSURE: 81 MMHG | WEIGHT: 220 LBS

## 2025-07-17 DIAGNOSIS — R07.9 CHEST PAIN, UNSPECIFIED TYPE: Primary | ICD-10-CM

## 2025-07-17 DIAGNOSIS — I25.10 CORONARY ARTERY DISEASE INVOLVING NATIVE CORONARY ARTERY OF NATIVE HEART WITHOUT ANGINA PECTORIS: ICD-10-CM

## 2025-07-17 PROCEDURE — 93005 ELECTROCARDIOGRAM TRACING: CPT | Performed by: NURSE PRACTITIONER

## 2025-07-17 PROCEDURE — 99212 OFFICE O/P EST SF 10 MIN: CPT

## 2025-07-17 PROCEDURE — 99214 OFFICE O/P EST MOD 30 MIN: CPT | Performed by: NURSE PRACTITIONER

## 2025-07-17 ASSESSMENT — ENCOUNTER SYMPTOMS
DEPRESSION: 0
OCCASIONAL FEELINGS OF UNSTEADINESS: 0
CONSTITUTIONAL NEGATIVE: 1
MUSCULOSKELETAL NEGATIVE: 1
GASTROINTESTINAL NEGATIVE: 1
RESPIRATORY NEGATIVE: 1
NEUROLOGICAL NEGATIVE: 1
LOSS OF SENSATION IN FEET: 0
CARDIOVASCULAR NEGATIVE: 1

## 2025-07-17 NOTE — PROGRESS NOTES
Chief Complaint:   Follow-up CAD and HPL    History Of Present Illness:    .Mr Jiang returns in follow up.  Denies chest pain, sob, palpitations or pedal edema.  Has been having chest fullness.  ECG done shows NSR.  Has had some home stressors with grief and anxiety.  Will obtain stress echo.          Last Recorded Vitals:  Blood pressure 124/81, pulse 79, weight 99.8 kg (220 lb), SpO2 97%.     Past Medical History:  Medical History[1]     Past Surgical History:  Surgical History[2]    Social History:  Social History[3]    Family History:  Family History[4]      Allergies:  Opioids-meperidine and related, Opioids-methadone and related, Opioids - morphine analogues, and Vicodin [hydrocodone-acetaminophen]    Outpatient Medications:  Current Medications[5]     Physical Exam:  Cardiovascular:      PMI at left midclavicular line. Normal rate. Regular rhythm. Normal S1. Normal S2.       Murmurs: There is no murmur.      No gallop.  No click. No rub.   Pulses:     Intact distal pulses.   Edema:     Peripheral edema absent.       ROS:  Review of Systems   Constitutional: Negative.   Cardiovascular: Negative.    Respiratory: Negative.     Skin: Negative.    Musculoskeletal: Negative.    Gastrointestinal: Negative.    Genitourinary: Negative.    Neurological: Negative.           Last Labs: direct LDL 06/2025 has orders for other labs with pcp  CBC -  Lab Results   Component Value Date    WBC 8.9 07/16/2024    HGB 14.5 07/16/2024    HCT 42.7 07/16/2024    MCV 86 07/16/2024     07/16/2024       CMP -  Lab Results   Component Value Date    CALCIUM 9.4 07/16/2024    PROT 6.7 07/16/2024    ALBUMIN 4.2 07/16/2024    AST 16 07/16/2024    ALT 19 07/16/2024    ALKPHOS 45 07/16/2024    BILITOT 1.1 07/16/2024       LIPID PANEL -   Lab Results   Component Value Date    CHOL 213 (H) 06/05/2025    TRIG 287 (H) 06/05/2025    HDL 55 06/05/2025    CHHDL 3.9 06/05/2025    LDLF 108 (H) 06/22/2023    VLDL 64 (H) 07/16/2024    NHDL 158  (H) 06/05/2025       RENAL FUNCTION PANEL -   Lab Results   Component Value Date    GLUCOSE 101 (H) 07/16/2024     07/16/2024    K 4.3 07/16/2024     07/16/2024    CO2 29 07/16/2024    ANIONGAP 12 07/16/2024    BUN 16 07/16/2024    CREATININE 1.02 07/16/2024    GFRMALE 85 06/22/2023    CALCIUM 9.4 07/16/2024    ALBUMIN 4.2 07/16/2024        Lab Results   Component Value Date    HGBA1C 5.2 07/16/2024         Assessment/Plan   Problem List Items Addressed This Visit    None    1.  Hyperlipidemia.  This patient does have a significant hyperlipidemia originally treated with atorvastatin 40 mg daily and then converted to rosuvastatin 40 mg daily.  Lipid panel 7/16/2024 includes cholesterol 177 LDL 63 triglyceride 319 HDL 49.  A repeat lab work from 8/20/2024 included a direct LDL of 102 which may be more accurate given his hypertriglyceridemia.  Discussed the option of adding ezetimibe 10 mg q. which the patient wishes to defer pending a follow-up lipid panel and direct DL to be performed just before next visit in 6 months.  If the direct LDL is not acceptable then he would be more willing to begin the ezetimibe 10 mg daily.  Have discussed that Zetia likely will not get LDL where we need it to be, but could try it.  Would prefer to use PCSK-9.  Will defer to clinical pharmacy.  2.  Coronary artery disease.  This patient did have a CT coronary calcium score of 128 performed on 7/18/2023.  His EKG from 5/22/2022 showed sinus rhythm and was unremarkable.  He does have an ascending thoracic aorta measuring 4.0 cm on the CT scan. Due to recent symptoms of chest fullness, will obtain stress echo.   3.  Former smoking.  Patient discontinued smoking age 26. reviewed  4.  Nondiabetic. reviewed  5.  Not hypertensive. reviewed  6.  Status post right inguinal hernia repair. reviewed  7.  Status post basal cell carcinoma removal.reviewed  8.  History of scalp cysts. reviewed         Arielle Carcamo, APRN-CNP       [1]    Past Medical History:  Diagnosis Date    Abnormal ECG 2024    Allergic     Body mass index (BMI) 32.0-32.9, adult 2020    BMI 32.0-32.9,adult    Encounter for immunization 2020    Need for DTP vaccine    Hyperlipidemia     Tinnitus, left ear 2016    Tinnitus of left ear    Unspecified disorder of left ear 2016    Ear problems, left   [2]   Past Surgical History:  Procedure Laterality Date    CIRCUMCISION, PRIMARY      HERNIA REPAIR  2013    Inguinal Hernia Repair    WISDOM TOOTH EXTRACTION     [3]   Social History  Socioeconomic History    Marital status:    Tobacco Use    Smoking status: Former     Current packs/day: 0.00     Average packs/day: 0.3 packs/day for 25.0 years (6.3 ttl pk-yrs)     Types: Cigarettes     Start date: 1988     Quit date: 1998     Years since quittin.1    Smokeless tobacco: Never    Tobacco comments:     Casual smoker in college   Substance and Sexual Activity    Alcohol use: Yes     Alcohol/week: 0.0 standard drinks of alcohol    Drug use: Never    Sexual activity: Yes     Partners: Female     Birth control/protection: Condom Male   [4]   Family History  Problem Relation Name Age of Onset    Early natural death Paternal Grandfather Onofre     Heart disease Paternal Grandfather Onofre     Cancer Paternal Grandmother Nichole     Learning disabilities Son Ruben     Atrial fibrillation Mother Prisca Gonzales     Cancer Father Onofre Cassano     Heart attack Paternal Grandfather Onofre     Heart disease Maternal Grandfather Al     Heart disease Maternal Grandfather Al     Heart attack Maternal Grandmother Al     Heart disease Maternal Grandfather Al    [5]   Current Outpatient Medications   Medication Sig Dispense Refill    aspirin 81 mg EC tablet Take 1 tablet (81 mg) by mouth once daily.      rosuvastatin (Crestor) 40 mg tablet Take 1 tablet (40 mg) by mouth once daily at bedtime. 90 tablet 3    gabapentin (Neurontin) 100 mg capsule Take 1 capsule (100 mg)  by mouth 3 times a day for 10 days. 30 capsule 0     No current facility-administered medications for this visit.

## 2025-07-19 LAB
ATRIAL RATE: 68 BPM
P AXIS: 50 DEGREES
P OFFSET: 182 MS
P ONSET: 118 MS
PR INTERVAL: 186 MS
Q ONSET: 211 MS
QRS COUNT: 11 BEATS
QRS DURATION: 98 MS
QT INTERVAL: 388 MS
QTC CALCULATION(BAZETT): 412 MS
QTC FREDERICIA: 404 MS
R AXIS: -27 DEGREES
T AXIS: 43 DEGREES
T OFFSET: 405 MS
VENTRICULAR RATE: 68 BPM

## 2025-07-22 LAB — PSA SERPL-MCNC: 2.04 NG/ML

## 2025-07-23 LAB
ALBUMIN SERPL-MCNC: 4.7 G/DL (ref 3.6–5.1)
ALP SERPL-CCNC: 44 U/L (ref 35–144)
ALT SERPL-CCNC: 20 U/L (ref 9–46)
ANION GAP SERPL CALCULATED.4IONS-SCNC: 10 MMOL/L (CALC) (ref 7–17)
APPEARANCE UR: CLEAR
AST SERPL-CCNC: 19 U/L (ref 10–35)
BILIRUB SERPL-MCNC: 1.3 MG/DL (ref 0.2–1.2)
BILIRUB UR QL STRIP: NEGATIVE
BUN SERPL-MCNC: 15 MG/DL (ref 7–25)
CALCIUM SERPL-MCNC: 10.1 MG/DL (ref 8.6–10.3)
CHLORIDE SERPL-SCNC: 101 MMOL/L (ref 98–110)
CHOLEST SERPL-MCNC: 185 MG/DL
CHOLEST/HDLC SERPL: 3.2 (CALC)
CO2 SERPL-SCNC: 29 MMOL/L (ref 20–32)
COLOR UR: YELLOW
CREAT SERPL-MCNC: 1.04 MG/DL (ref 0.7–1.3)
EGFRCR SERPLBLD CKD-EPI 2021: 85 ML/MIN/1.73M2
EST. AVERAGE GLUCOSE BLD GHB EST-MCNC: 114 MG/DL
EST. AVERAGE GLUCOSE BLD GHB EST-SCNC: 6.3 MMOL/L
GGT SERPL-CCNC: 22 U/L (ref 3–85)
GLUCOSE SERPL-MCNC: 102 MG/DL (ref 65–99)
GLUCOSE UR QL STRIP: NEGATIVE
HBA1C MFR BLD: 5.6 %
HDLC SERPL-MCNC: 57 MG/DL
HGB UR QL STRIP: NEGATIVE
KETONES UR QL STRIP: NEGATIVE
LDLC SERPL CALC-MCNC: 91 MG/DL (CALC)
LEUKOCYTE ESTERASE UR QL STRIP: NEGATIVE
NITRITE UR QL STRIP: NEGATIVE
NONHDLC SERPL-MCNC: 128 MG/DL (CALC)
PH UR STRIP: 6.5 [PH] (ref 5–8)
POTASSIUM SERPL-SCNC: 4.6 MMOL/L (ref 3.5–5.3)
PROT SERPL-MCNC: 7 G/DL (ref 6.1–8.1)
PROT UR QL STRIP: NEGATIVE
SODIUM SERPL-SCNC: 140 MMOL/L (ref 135–146)
SP GR UR STRIP: 1.01 (ref 1–1.03)
TRIGL SERPL-MCNC: 283 MG/DL

## 2025-07-24 NOTE — PROGRESS NOTES
"  Pharmacist Clinic: Cardiology Management    Attila Jiang Jr. \"Onofre\" is a 54 y.o. male was referred to Clinical Pharmacy Team for Cholesterol management.     Referring Provider: Arielle Carcamo APRN*    THIS IS A NEW PATIENT APPOINTMENT. PATIENT WILL BE ESTABLISHING CARE WITH CLINICAL PHARMACY.    Appointment was completed by Onofre who was reached at primary number.    Allergies Reviewed? Yes    Allergies[1]    Medical History[2]    Medications Ordered Prior to Encounter[3]      RELEVANT LAB RESULTS:  Lab Results   Component Value Date    BILITOT 1.3 (H) 07/22/2025    CALCIUM 10.1 07/22/2025    CO2 29 07/22/2025     07/22/2025    CREATININE 1.04 07/22/2025    GLUCOSE 102 (H) 07/22/2025    ALKPHOS 44 07/22/2025    K 4.6 07/22/2025    PROT 7.0 07/22/2025     07/22/2025    AST 19 07/22/2025    ALT 20 07/22/2025    BUN 15 07/22/2025    ANIONGAP 10 07/22/2025    GGT 22 07/22/2025    ALBUMIN 4.7 07/22/2025    GFRMALE 85 06/22/2023     Lab Results   Component Value Date    TRIG 283 (H) 07/22/2025    CHOL 185 07/22/2025    LDLCALC 91 07/22/2025    HDL 57 07/22/2025     No results found for: \"BMCBC\", \"CBCDIF\"     PHARMACEUTICAL ASSESSMENT:    MEDICATION RECONCILIATION    Was a medication reconciliation completed at this visit? Yes  Home Pharmacy Reviewed? Yes, describe: [facility]    Added:  - loratadine, zinc, Vitamin D3, thiamine  Changed:  - none  Removed:  - none    Drug Interactions? No    Medication Documentation Review Audit       Reviewed by EAV Diaz-CNP (Nurse Practitioner) on 07/17/25 at 1505      Medication Order Taking? Sig Documenting Provider Last Dose Status   aspirin 81 mg EC tablet [id number] Yes Take 1 tablet (81 mg) by mouth once daily. Historical Provider, MD  Active   gabapentin (Neurontin) 100 mg capsule [id number]  Take 1 capsule (100 mg) by mouth 3 times a day for 10 days. Kumar Soto MD  Flag for Review   rosuvastatin (Crestor) 40 mg tablet [id number] Yes Take 1 " tablet (40 mg) by mouth once daily at bedtime. Attila Peralta MD  Active                    DISEASE MANAGEMENT ASSESSMENT:     HYPERCHOLESTEROLEMIA ASSESSMENT    RECENT LIPID PANEL (DATE): 7/22/25  Lab Results   Component Value Date    TRIG 283 (H) 07/22/2025    CHOL 185 07/22/2025    LDLCALC 91 07/22/2025    HDL 57 07/22/2025          ASCVD SCORE: The 10-year ASCVD risk score (Mariya DK, et al., 2019) is: 4.3%    Values used to calculate the score:      Age: 54 years      Clincally relevant sex: Male      Is Non- : No      Diabetic: No      Tobacco smoker: No      Systolic Blood Pressure: 124 mmHg      Is BP treated: No      HDL Cholesterol: 57 mg/dL      Total Cholesterol: 185 mg/dL  Coronary Heart Disease (MI, angina, coronary artery stenosis): No   History of ischemic stroke? No   History of carotid artery stenosis? No   Peripheral artery disease? No   ASCVD RISK FACTORS:    CKD? No   Diabetes? No   HTN? No   Persistently elevated LDL? Yes   Elevated triglycerides? Yes   Inflammatory diseases (rheumatoid arthritis, psoriasis, HIV)? No    CURRENT PHARMACOTHERAPY  - Statin? Yes, describe: rosuvastatin 40mg daily  - Ezetimibe? No  - PCSK9-I? No  - Other lipid lowering agents? No      ASSESSMENT    Affordability/Accessibility: PA pending  Adherence/Organization: reports adherence  Adverse Effects: none reported  Recent Hospitalizations: No  Diet: does not focus on diet that much  Exercise: Yes, describe: started walking more frequently. Had back injury that previously prevented him from exercising.  Tobacco Use: No  Alcohol Use: Yes  Next Lipid Panel: due after starting Repatha      EDUCATION/COUNSELING:  - Counseled patient on MOA, expectations, side effects, duration of therapy, contraindications, administration, and monitoring parameters  - Answered all patient questions and concerns    Repatha Monitoring and Education:  Side effects to expect with Repatha include injection site  pain/swelling/redness, cold-like symptoms, headache, and rash   Monitor cholesterol levels before treatment, 4 to 12 weeks after the start of treatment, and then every 3 to 12 months thereafter.   Administration:  Repatha must be kept refrigerated, if necessary a pen may be kept at room temperature for up to 30 days.  Remove the Pen from the refrigerator. Leave the base cap on until you are ready to inject.  Check the Pen label to make sure you have the right medicine and it has not . Additionally, ensure that the solution is not cloudy, discolored, or has particles in it.  Prior to administration, wash and rinse hands.   Next, choose your injection site (You may inject into your abdomen, thigh; Another person may give you the injection in your upper arm)  Change (rotate) your injection site with each dose. You may use the same area of your body, but be sure to choose a different injection site in that area.  Once administration site has been selected, use a new alcohol swab to sanitize the administration site and allow to air dry.  Pull off the orange cap only when you are ready to inject. The orange cap can not be left off for more than 5 minutes as this can cause the medication to dry out.   Stretch or pinch your injection site to create a firm surface and place the yellow safety guard on your skin at a 90 degree angle.  Firmly push down the autoinjector onto your skin until it stops moving. When you are ready to inject press the gray start button. You will hear 2 clicks, one at the start of the injection and another once the injection is complete. Continue pushing down on your skin for about 15 seconds. The window will turn yellow when the injection is complete.   Dispose of the pen in a sharps container (Coffee can, laundry detergent bottle)  Injections will should be done on the same day every other week, if you forget you have up to 7 days to remember to do your next dose. If less than 7 days remain  before the next scheduled dose, skip the missed dose and administer the next dose on the regularly scheduled day.      DISCUSSION/NOTES:   Above education was given about Repatha.  PA currently pending through insurance. Will be able to use copay card if needed.  Patient wants to wait until stress test before starting any new medications.  Answered all questions about Repatha.    ASSESSMENT:    Assessment/Plan   Problem List Items Addressed This Visit       Hyperlipidemia - Primary    LDL above goal <55 plan to start PCSK9i medication         Relevant Medications    cholecalciferol (Vitamin D3) 25 mcg (1,000 units) tablet    thiamine 100 mg tablet    Other Relevant Orders    Referral to Clinical Pharmacy         RECOMMENDATIONS/PLAN:    START  Repatha 140mg every 14 days  CONTINUE  Rosuvastatin 40mg daily    Last Appnt with Referring Provider: 7/17/25  Next Appnt with Referring Provider: 8/14/25  Clinical Pharmacist follow up: pending PA approval  VAF/Application Expiration: No  Type of Encounter: January Tapia PharmD    Verbal consent to manage patient's drug therapy was obtained from the patient . They were informed they may decline to participate or withdraw from participation in pharmacy services at any time.    Continue all meds under the continuation of care with the referring provider and clinical pharmacy team.            [1]   Allergies  Allergen Reactions    Opioids-Meperidine And Related Nausea/vomiting    Opioids-Methadone And Related Nausea/vomiting    Opioids - Morphine Analogues Angioedema    Vicodin [Hydrocodone-Acetaminophen] Diarrhea   [2]   Past Medical History:  Diagnosis Date    Abnormal ECG 08/2024    Allergic 6/2015    Body mass index (BMI) 32.0-32.9, adult 11/24/2020    BMI 32.0-32.9,adult    Encounter for immunization 04/17/2020    Need for DTP vaccine    Hyperlipidemia     Tinnitus, left ear 07/07/2016    Tinnitus of left ear    Unspecified disorder of left ear 07/07/2016     Ear problems, left   [3]   Current Outpatient Medications on File Prior to Visit   Medication Sig Dispense Refill    aspirin 81 mg EC tablet Take 1 tablet (81 mg) by mouth once daily.      cholecalciferol (Vitamin D3) 25 mcg (1,000 units) tablet Take 1 tablet (25 mcg) by mouth once daily.      loratadine (Claritin) 10 mg tablet Take 1 tablet (10 mg) by mouth once daily. During the summer      rosuvastatin (Crestor) 40 mg tablet Take 1 tablet (40 mg) by mouth once daily at bedtime. 90 tablet 3    thiamine 100 mg tablet Take 1 tablet (100 mg) by mouth once daily.      zinc gluconate 50 mg elemental tablet Take 1 tablet by mouth once daily.       No current facility-administered medications on file prior to visit.

## 2025-07-25 ENCOUNTER — TELEMEDICINE (OUTPATIENT)
Dept: PHARMACY | Facility: HOSPITAL | Age: 55
End: 2025-07-25
Payer: COMMERCIAL

## 2025-07-25 DIAGNOSIS — E78.49 OTHER HYPERLIPIDEMIA: Primary | ICD-10-CM

## 2025-07-25 RX ORDER — CHOLECALCIFEROL (VITAMIN D3) 25 MCG
25 TABLET ORAL DAILY
COMMUNITY

## 2025-07-25 RX ORDER — LORATADINE 10 MG/1
10 TABLET ORAL DAILY
COMMUNITY

## 2025-07-25 RX ORDER — ZINC GLUCONATE 50 MG
50 TABLET ORAL DAILY
COMMUNITY

## 2025-07-25 RX ORDER — LANOLIN ALCOHOL/MO/W.PET/CERES
100 CREAM (GRAM) TOPICAL DAILY
COMMUNITY

## 2025-07-25 NOTE — Clinical Note
Gave Onofre education about Repatha. Seems he is agreeable to start the medication, but he wants to wait until his stress test and follow up with you before he starts it. I am still waiting on his PA to be approved for the Repatha.

## 2025-08-01 ENCOUNTER — APPOINTMENT (OUTPATIENT)
Dept: CARDIOLOGY | Facility: CLINIC | Age: 55
End: 2025-08-01
Payer: COMMERCIAL

## 2025-08-11 ENCOUNTER — TELEPHONE (OUTPATIENT)
Dept: PHARMACY | Facility: HOSPITAL | Age: 55
End: 2025-08-11
Payer: COMMERCIAL

## 2025-08-14 ENCOUNTER — APPOINTMENT (OUTPATIENT)
Dept: CARDIOLOGY | Facility: CLINIC | Age: 55
End: 2025-08-14
Payer: COMMERCIAL

## 2025-08-28 ENCOUNTER — APPOINTMENT (OUTPATIENT)
Dept: PRIMARY CARE | Facility: CLINIC | Age: 55
End: 2025-08-28
Payer: COMMERCIAL

## 2025-08-28 PROBLEM — C44.519 BASAL CELL CARCINOMA OF SKIN OF OTHER PART OF TRUNK: Status: RESOLVED | Noted: 2022-11-04 | Resolved: 2025-08-28

## 2025-08-28 PROBLEM — C44.619 BASAL CELL CARCINOMA OF SKIN OF LEFT UPPER LIMB, INCLUDING SHOULDER: Status: RESOLVED | Noted: 2022-11-04 | Resolved: 2025-08-28

## 2025-09-03 PROBLEM — F10.20 UNCOMPLICATED ALCOHOL DEPENDENCE (MULTI): Status: ACTIVE | Noted: 2025-09-03

## 2025-09-03 ASSESSMENT — ENCOUNTER SYMPTOMS
CARDIOVASCULAR NEGATIVE: 1
EYES NEGATIVE: 1
ALLERGIC/IMMUNOLOGIC NEGATIVE: 1
NEUROLOGICAL NEGATIVE: 1
HEMATOLOGIC/LYMPHATIC NEGATIVE: 1
CONSTITUTIONAL NEGATIVE: 1
RESPIRATORY NEGATIVE: 1
ENDOCRINE NEGATIVE: 1
PSYCHIATRIC NEGATIVE: 1
MUSCULOSKELETAL NEGATIVE: 1
GASTROINTESTINAL NEGATIVE: 1

## 2025-09-04 ENCOUNTER — HOSPITAL ENCOUNTER (OUTPATIENT)
Dept: CARDIOLOGY | Facility: CLINIC | Age: 55
Discharge: HOME | End: 2025-09-04
Payer: COMMERCIAL

## 2025-09-04 DIAGNOSIS — R07.9 CHEST PAIN, UNSPECIFIED TYPE: ICD-10-CM

## 2025-09-04 PROCEDURE — 93350 STRESS TTE ONLY: CPT

## 2025-09-11 ENCOUNTER — APPOINTMENT (OUTPATIENT)
Dept: CARDIOLOGY | Facility: CLINIC | Age: 55
End: 2025-09-11
Payer: COMMERCIAL